# Patient Record
Sex: MALE | Race: WHITE | NOT HISPANIC OR LATINO | URBAN - METROPOLITAN AREA
[De-identification: names, ages, dates, MRNs, and addresses within clinical notes are randomized per-mention and may not be internally consistent; named-entity substitution may affect disease eponyms.]

---

## 2019-03-07 ENCOUNTER — OFFICE (AMBULATORY)
Dept: URBAN - METROPOLITAN AREA PATHOLOGY 4 | Facility: PATHOLOGY | Age: 64
End: 2019-03-07
Payer: COMMERCIAL

## 2019-03-07 ENCOUNTER — ON CAMPUS - OUTPATIENT (AMBULATORY)
Dept: URBAN - METROPOLITAN AREA HOSPITAL 2 | Facility: HOSPITAL | Age: 64
End: 2019-03-07
Payer: COMMERCIAL

## 2019-03-07 VITALS
SYSTOLIC BLOOD PRESSURE: 113 MMHG | SYSTOLIC BLOOD PRESSURE: 117 MMHG | HEART RATE: 87 BPM | OXYGEN SATURATION: 99 % | DIASTOLIC BLOOD PRESSURE: 84 MMHG | OXYGEN SATURATION: 95 % | HEART RATE: 83 BPM | HEART RATE: 91 BPM | DIASTOLIC BLOOD PRESSURE: 64 MMHG | HEART RATE: 79 BPM | WEIGHT: 140 LBS | OXYGEN SATURATION: 100 % | RESPIRATION RATE: 12 BRPM | SYSTOLIC BLOOD PRESSURE: 95 MMHG | SYSTOLIC BLOOD PRESSURE: 92 MMHG | SYSTOLIC BLOOD PRESSURE: 104 MMHG | SYSTOLIC BLOOD PRESSURE: 99 MMHG | HEART RATE: 95 BPM | DIASTOLIC BLOOD PRESSURE: 66 MMHG | OXYGEN SATURATION: 97 % | SYSTOLIC BLOOD PRESSURE: 91 MMHG | SYSTOLIC BLOOD PRESSURE: 96 MMHG | DIASTOLIC BLOOD PRESSURE: 62 MMHG | HEART RATE: 88 BPM | TEMPERATURE: 98.4 F | HEIGHT: 72 IN | DIASTOLIC BLOOD PRESSURE: 59 MMHG | SYSTOLIC BLOOD PRESSURE: 98 MMHG | DIASTOLIC BLOOD PRESSURE: 76 MMHG | SYSTOLIC BLOOD PRESSURE: 101 MMHG | HEART RATE: 97 BPM | RESPIRATION RATE: 18 BRPM | RESPIRATION RATE: 16 BRPM | DIASTOLIC BLOOD PRESSURE: 63 MMHG | HEART RATE: 86 BPM | OXYGEN SATURATION: 98 % | DIASTOLIC BLOOD PRESSURE: 67 MMHG | RESPIRATION RATE: 14 BRPM | SYSTOLIC BLOOD PRESSURE: 85 MMHG | HEART RATE: 92 BPM

## 2019-03-07 DIAGNOSIS — D12.8 BENIGN NEOPLASM OF RECTUM: ICD-10-CM

## 2019-03-07 DIAGNOSIS — K31.9 DISEASE OF STOMACH AND DUODENUM, UNSPECIFIED: ICD-10-CM

## 2019-03-07 DIAGNOSIS — K31.819 ANGIODYSPLASIA OF STOMACH AND DUODENUM WITHOUT BLEEDING: ICD-10-CM

## 2019-03-07 DIAGNOSIS — D50.0 IRON DEFICIENCY ANEMIA SECONDARY TO BLOOD LOSS (CHRONIC): ICD-10-CM

## 2019-03-07 DIAGNOSIS — D12.3 BENIGN NEOPLASM OF TRANSVERSE COLON: ICD-10-CM

## 2019-03-07 DIAGNOSIS — K63.5 POLYP OF COLON: ICD-10-CM

## 2019-03-07 DIAGNOSIS — K20.8 OTHER ESOPHAGITIS: ICD-10-CM

## 2019-03-07 DIAGNOSIS — K31.89 OTHER DISEASES OF STOMACH AND DUODENUM: ICD-10-CM

## 2019-03-07 DIAGNOSIS — K29.70 GASTRITIS, UNSPECIFIED, WITHOUT BLEEDING: ICD-10-CM

## 2019-03-07 PROBLEM — D12.2 BENIGN NEOPLASM OF ASCENDING COLON: Status: ACTIVE | Noted: 2019-03-07

## 2019-03-07 PROBLEM — K20.9 ESOPHAGITIS, UNSPECIFIED: Status: ACTIVE | Noted: 2019-03-07

## 2019-03-07 PROBLEM — K62.1 RECTAL POLYP: Status: ACTIVE | Noted: 2019-03-07

## 2019-03-07 LAB
GI HISTOLOGY: A. UNSPECIFIED: (no result)
GI HISTOLOGY: B. SELECT: (no result)
GI HISTOLOGY: C. UNSPECIFIED: (no result)
GI HISTOLOGY: D. UNSPECIFIED: (no result)
GI HISTOLOGY: PDF REPORT: (no result)

## 2019-03-07 PROCEDURE — 45385 COLONOSCOPY W/LESION REMOVAL: CPT | Performed by: INTERNAL MEDICINE

## 2019-03-07 PROCEDURE — 88305 TISSUE EXAM BY PATHOLOGIST: CPT | Performed by: INTERNAL MEDICINE

## 2019-03-07 PROCEDURE — 43270 EGD LESION ABLATION: CPT | Performed by: INTERNAL MEDICINE

## 2019-03-07 PROCEDURE — 43239 EGD BIOPSY SINGLE/MULTIPLE: CPT | Mod: 59 | Performed by: INTERNAL MEDICINE

## 2019-03-07 RX ORDER — PANTOPRAZOLE SODIUM 40 MG/1
40 TABLET, DELAYED RELEASE ORAL
Qty: 90 | Refills: 3 | Status: ACTIVE
Start: 2019-03-07

## 2019-03-07 NOTE — SERVICEHPINOTES
MELITON DEVI  is a  63  male   who presents today for a  EGD-Colonoscopy   for   the indications listed below. The updated Patient Profile was reviewed prior to the procedure, in conjunction with the Physical Exam, including medical conditions, surgical procedures, medications, allergies, family history and social history. See Physical Exam time stamp below for date and time of HPI completion.Pre-operatively, I reviewed the indication(s) for the procedure, the risks of the procedure [including but not limited to: unexpected bleeding possibly requiring hospitalization and/or unplanned repeat procedures, perforation possibly requiring surgical treatment, missed lesions and complications of sedation/MAC (also explained by anesthesia staff)]. I have evaluated the patient for risks associated with the planned anesthesia and the procedure to be performed and find the patient an acceptable candidate for IV sedation.Multiple opportunities were provided for any questions or concerns, and all questions were answered satisfactorily before any anesthesia was administered. We will proceed with the planned procedure.BR

## 2023-02-16 PROBLEM — M54.2 CHRONIC CERVICAL PAIN: Status: ACTIVE | Noted: 2023-02-16

## 2023-02-16 PROBLEM — M25.512 CHRONIC LEFT SHOULDER PAIN: Status: ACTIVE | Noted: 2023-02-16

## 2023-02-16 PROBLEM — B37.0 ORAL YEAST INFECTION: Status: ACTIVE | Noted: 2023-02-16

## 2023-02-16 PROBLEM — Z86.73 HISTORY OF STROKE: Status: ACTIVE | Noted: 2023-02-16

## 2023-02-16 PROBLEM — I10 HTN (HYPERTENSION), BENIGN: Status: ACTIVE | Noted: 2023-02-16

## 2023-02-16 PROBLEM — M54.40 LUMBAGO WITH SCIATICA: Status: ACTIVE | Noted: 2023-02-16

## 2023-02-16 PROBLEM — M50.20 CERVICAL DISC HERNIATION: Status: ACTIVE | Noted: 2023-02-16

## 2023-02-16 PROBLEM — G47.33 OSA (OBSTRUCTIVE SLEEP APNEA): Status: ACTIVE | Noted: 2023-02-16

## 2023-02-16 PROBLEM — R51.9 CEPHALGIA: Status: ACTIVE | Noted: 2023-02-16

## 2023-02-16 PROBLEM — K04.7 DENTAL ABSCESS: Status: ACTIVE | Noted: 2023-02-16

## 2023-02-16 PROBLEM — L30.9 ECZEMA: Status: ACTIVE | Noted: 2023-02-16

## 2023-02-16 PROBLEM — G89.29 CHRONIC CERVICAL PAIN: Status: ACTIVE | Noted: 2023-02-16

## 2023-02-16 PROBLEM — S22.41XD CLOSED FRACTURE OF MULTIPLE RIBS OF RIGHT SIDE WITH ROUTINE HEALING, SUBSEQUENT ENCOUNTER: Status: ACTIVE | Noted: 2023-02-16

## 2023-02-16 PROBLEM — M43.22 CERVICAL VERTEBRAL FUSION: Status: ACTIVE | Noted: 2023-02-16

## 2023-02-16 PROBLEM — H69.93 DYSFUNCTION OF BOTH EUSTACHIAN TUBES: Status: ACTIVE | Noted: 2023-02-16

## 2023-02-16 PROBLEM — M48.02 CERVICAL STENOSIS OF SPINE: Status: ACTIVE | Noted: 2023-02-16

## 2023-02-16 PROBLEM — I63.9 CVA (CEREBRAL VASCULAR ACCIDENT) (MULTI): Status: ACTIVE | Noted: 2023-02-16

## 2023-02-16 PROBLEM — F17.200 NICOTINE DEPENDENCE: Status: ACTIVE | Noted: 2023-02-16

## 2023-02-16 PROBLEM — E03.9 HYPOTHYROID: Status: ACTIVE | Noted: 2023-02-16

## 2023-02-16 PROBLEM — H61.23 BILATERAL IMPACTED CERUMEN: Status: ACTIVE | Noted: 2023-02-16

## 2023-02-16 PROBLEM — H26.9 CATARACT: Status: ACTIVE | Noted: 2023-02-16

## 2023-02-16 PROBLEM — R05.9 COUGH: Status: ACTIVE | Noted: 2023-02-16

## 2023-02-16 PROBLEM — H66.90 OTITIS MEDIA, ACUTE: Status: ACTIVE | Noted: 2023-02-16

## 2023-02-16 PROBLEM — R32 INCONTINENCE OF URINE: Status: ACTIVE | Noted: 2023-02-16

## 2023-02-16 PROBLEM — G50.0 TRIGEMINAL NEURALGIA: Status: ACTIVE | Noted: 2023-02-16

## 2023-02-16 PROBLEM — M25.551 RIGHT HIP PAIN: Status: ACTIVE | Noted: 2023-02-16

## 2023-02-16 PROBLEM — M54.12 CERVICAL RADICULITIS: Status: ACTIVE | Noted: 2023-02-16

## 2023-02-16 PROBLEM — G89.29 CHRONIC PAIN: Status: ACTIVE | Noted: 2023-02-16

## 2023-02-16 PROBLEM — F32.A DEPRESSION: Status: ACTIVE | Noted: 2023-02-16

## 2023-02-16 PROBLEM — G51.0 BELL PALSY: Status: ACTIVE | Noted: 2023-02-16

## 2023-02-16 PROBLEM — S09.90XA HEAD INJURIES: Status: ACTIVE | Noted: 2023-02-16

## 2023-02-16 PROBLEM — J20.9 ACUTE BRONCHITIS: Status: ACTIVE | Noted: 2023-02-16

## 2023-02-16 PROBLEM — M54.81 OCCIPITAL NEURALGIA: Status: ACTIVE | Noted: 2023-02-16

## 2023-02-16 PROBLEM — Z98.890 HISTORY OF BRAIN SURGERY: Status: ACTIVE | Noted: 2023-02-16

## 2023-02-16 PROBLEM — J98.01 BRONCHOSPASM, ACUTE: Status: ACTIVE | Noted: 2023-02-16

## 2023-02-16 PROBLEM — J01.90 ACUTE SINUSITIS: Status: ACTIVE | Noted: 2023-02-16

## 2023-02-16 PROBLEM — J06.9 VIRAL URI WITH COUGH: Status: ACTIVE | Noted: 2023-02-16

## 2023-02-16 PROBLEM — M62.838 TRAPEZIUS MUSCLE SPASM: Status: ACTIVE | Noted: 2023-02-16

## 2023-02-16 PROBLEM — R51.9 LEFT FACIAL PAIN: Status: ACTIVE | Noted: 2023-02-16

## 2023-02-16 PROBLEM — R53.83 FATIGUE: Status: ACTIVE | Noted: 2023-02-16

## 2023-02-16 PROBLEM — T14.8XXA NERVE INJURY: Status: ACTIVE | Noted: 2023-02-16

## 2023-02-16 PROBLEM — E78.5 HYPERLIPEMIA: Status: ACTIVE | Noted: 2023-02-16

## 2023-02-16 PROBLEM — C61 PROSTATE CA (MULTI): Status: ACTIVE | Noted: 2023-02-16

## 2023-02-16 PROBLEM — G47.00 INSOMNIA: Status: ACTIVE | Noted: 2023-02-16

## 2023-02-16 PROBLEM — M50.90 CERVICAL DISC DISEASE: Status: ACTIVE | Noted: 2023-02-16

## 2023-02-16 PROBLEM — E66.9 OBESITY (BMI 30-39.9): Status: ACTIVE | Noted: 2023-02-16

## 2023-02-16 PROBLEM — G89.29 CHRONIC LEFT SHOULDER PAIN: Status: ACTIVE | Noted: 2023-02-16

## 2023-02-16 RX ORDER — LEVOTHYROXINE SODIUM 100 UG/1
CAPSULE ORAL
COMMUNITY
End: 2023-04-25

## 2023-02-16 RX ORDER — ATORVASTATIN CALCIUM 10 MG/1
1 TABLET, FILM COATED ORAL DAILY
COMMUNITY
End: 2023-04-25 | Stop reason: SDUPTHER

## 2023-02-16 RX ORDER — OXYCODONE AND ACETAMINOPHEN 5; 325 MG/1; MG/1
1 TABLET ORAL EVERY 8 HOURS PRN
COMMUNITY
Start: 2018-04-12 | End: 2023-11-10 | Stop reason: SDUPTHER

## 2023-02-16 RX ORDER — NALOXONE HYDROCHLORIDE 4 MG/.1ML
1 SPRAY NASAL AS NEEDED
COMMUNITY
Start: 2021-08-24 | End: 2023-04-25

## 2023-02-16 RX ORDER — LISINOPRIL 5 MG/1
1 TABLET ORAL DAILY
COMMUNITY
Start: 2015-10-29 | End: 2023-04-06

## 2023-02-16 RX ORDER — LEVOTHYROXINE SODIUM 100 UG/1
TABLET ORAL
COMMUNITY
Start: 2021-03-13 | End: 2023-04-06

## 2023-03-20 LAB
6-ACETYLMORPHINE: <25 NG/ML
7-AMINOCLONAZEPAM: <25 NG/ML
ALPHA-HYDROXYALPRAZOLAM: <25 NG/ML
ALPHA-HYDROXYMIDAZOLAM: <25 NG/ML
ALPRAZOLAM: <25 NG/ML
AMPHETAMINE (PRESENCE) IN URINE BY SCREEN METHOD: ABNORMAL
BARBITURATES PRESENCE IN URINE BY SCREEN METHOD: ABNORMAL
CANNABINOIDS IN URINE BY SCREEN METHOD: ABNORMAL
CHLORDIAZEPOXIDE: <25 NG/ML
CLONAZEPAM: <25 NG/ML
COCAINE (PRESENCE) IN URINE BY SCREEN METHOD: ABNORMAL
CODEINE: <50 NG/ML
CREATINE, URINE FOR DRUG: 16.2 MG/DL
DIAZEPAM: <25 NG/ML
DRUG SCREEN COMMENT URINE: ABNORMAL
EDDP: <25 NG/ML
FENTANYL CONFIRMATION, URINE: <2.5 NG/ML
HYDROCODONE: <25 NG/ML
HYDROMORPHONE: <25 NG/ML
LORAZEPAM: <25 NG/ML
METHADONE CONFIRMATION,URINE: <25 NG/ML
MIDAZOLAM: <25 NG/ML
MORPHINE URINE: <50 NG/ML
NORDIAZEPAM: <25 NG/ML
NORFENTANYL: <2.5 NG/ML
NORHYDROCODONE: <25 NG/ML
NOROXYCODONE: 339 NG/ML
O-DESMETHYLTRAMADOL: <50 NG/ML
OXAZEPAM: <25 NG/ML
OXYCODONE: 151 NG/ML
OXYMORPHONE: 125 NG/ML
PHENCYCLIDINE (PRESENCE) IN URINE BY SCREEN METHOD: ABNORMAL
SPECIFIC GRAVITY, URINE DRUG: 1.01
TEMAZEPAM: <25 NG/ML
TRAMADOL: <50 NG/ML
ZOLPIDEM METABOLITE (ZCA): <25 NG/ML
ZOLPIDEM: <25 NG/ML

## 2023-03-30 ENCOUNTER — APPOINTMENT (OUTPATIENT)
Dept: PRIMARY CARE | Facility: CLINIC | Age: 68
End: 2023-03-30
Payer: COMMERCIAL

## 2023-04-02 DIAGNOSIS — I10 ESSENTIAL (PRIMARY) HYPERTENSION: ICD-10-CM

## 2023-04-02 DIAGNOSIS — E03.9 HYPOTHYROIDISM, UNSPECIFIED: ICD-10-CM

## 2023-04-06 RX ORDER — LEVOTHYROXINE SODIUM 100 UG/1
TABLET ORAL
Qty: 150 TABLET | Refills: 0 | Status: SHIPPED | OUTPATIENT
Start: 2023-04-06 | End: 2023-04-25 | Stop reason: SDUPTHER

## 2023-04-06 RX ORDER — LISINOPRIL 5 MG/1
TABLET ORAL
Qty: 90 TABLET | Refills: 1 | Status: SHIPPED | OUTPATIENT
Start: 2023-04-06 | End: 2023-04-25 | Stop reason: SDUPTHER

## 2023-04-24 PROBLEM — M54.50 LUMBAGO: Status: ACTIVE | Noted: 2023-04-24

## 2023-04-24 PROBLEM — M54.16 BILATERAL LUMBAR RADICULOPATHY: Status: ACTIVE | Noted: 2023-04-24

## 2023-04-24 PROBLEM — J06.9 URI (UPPER RESPIRATORY INFECTION): Status: ACTIVE | Noted: 2023-04-24

## 2023-04-24 PROBLEM — M54.2 CHRONIC NECK PAIN: Status: ACTIVE | Noted: 2023-04-24

## 2023-04-24 PROBLEM — R51.9 PAIN, FACE: Status: ACTIVE | Noted: 2023-04-24

## 2023-04-24 PROBLEM — J40 BRONCHITIS: Status: ACTIVE | Noted: 2023-04-24

## 2023-04-24 PROBLEM — J06.9 ACUTE URI: Status: ACTIVE | Noted: 2023-04-24

## 2023-04-24 PROBLEM — G89.29 CHRONIC NECK PAIN: Status: ACTIVE | Noted: 2023-04-24

## 2023-04-24 RX ORDER — LEVOTHYROXINE SODIUM 200 UG/1
200 TABLET ORAL
COMMUNITY
End: 2023-04-25 | Stop reason: SDUPTHER

## 2023-04-24 RX ORDER — SERTRALINE HYDROCHLORIDE 100 MG/1
1 TABLET, FILM COATED ORAL DAILY
COMMUNITY
Start: 2022-05-12 | End: 2023-04-25

## 2023-04-24 RX ORDER — IBUPROFEN 400 MG/1
400 TABLET ORAL
COMMUNITY
Start: 2021-09-03 | End: 2023-04-25

## 2023-04-25 ENCOUNTER — OFFICE VISIT (OUTPATIENT)
Dept: PRIMARY CARE | Facility: CLINIC | Age: 68
End: 2023-04-25
Payer: COMMERCIAL

## 2023-04-25 VITALS
HEART RATE: 65 BPM | BODY MASS INDEX: 27.92 KG/M2 | TEMPERATURE: 97.7 F | OXYGEN SATURATION: 97 % | SYSTOLIC BLOOD PRESSURE: 134 MMHG | RESPIRATION RATE: 18 BRPM | WEIGHT: 173 LBS | DIASTOLIC BLOOD PRESSURE: 68 MMHG

## 2023-04-25 DIAGNOSIS — M48.02 CERVICAL STENOSIS OF SPINE: ICD-10-CM

## 2023-04-25 DIAGNOSIS — E03.9 HYPOTHYROIDISM, UNSPECIFIED: ICD-10-CM

## 2023-04-25 DIAGNOSIS — M54.16 BILATERAL LUMBAR RADICULOPATHY: ICD-10-CM

## 2023-04-25 DIAGNOSIS — E78.5 DYSLIPIDEMIA: Primary | ICD-10-CM

## 2023-04-25 DIAGNOSIS — G47.33 OSA (OBSTRUCTIVE SLEEP APNEA): ICD-10-CM

## 2023-04-25 DIAGNOSIS — I63.519 CEREBROVASCULAR ACCIDENT (CVA) DUE TO OCCLUSION OF MIDDLE CEREBRAL ARTERY, UNSPECIFIED BLOOD VESSEL LATERALITY (MULTI): ICD-10-CM

## 2023-04-25 DIAGNOSIS — I10 ESSENTIAL (PRIMARY) HYPERTENSION: ICD-10-CM

## 2023-04-25 DIAGNOSIS — E03.9 HYPOTHYROIDISM, UNSPECIFIED TYPE: ICD-10-CM

## 2023-04-25 DIAGNOSIS — C61 PROSTATE CA (MULTI): ICD-10-CM

## 2023-04-25 PROCEDURE — 1159F MED LIST DOCD IN RCRD: CPT | Performed by: INTERNAL MEDICINE

## 2023-04-25 PROCEDURE — 3075F SYST BP GE 130 - 139MM HG: CPT | Performed by: INTERNAL MEDICINE

## 2023-04-25 PROCEDURE — 3078F DIAST BP <80 MM HG: CPT | Performed by: INTERNAL MEDICINE

## 2023-04-25 PROCEDURE — 99214 OFFICE O/P EST MOD 30 MIN: CPT | Performed by: INTERNAL MEDICINE

## 2023-04-25 RX ORDER — ATORVASTATIN CALCIUM 10 MG/1
10 TABLET, FILM COATED ORAL DAILY
Qty: 90 TABLET | Refills: 1 | Status: SHIPPED | OUTPATIENT
Start: 2023-04-25 | End: 2023-10-13 | Stop reason: SDUPTHER

## 2023-04-25 RX ORDER — LEVOTHYROXINE SODIUM 100 UG/1
TABLET ORAL
Qty: 90 TABLET | Refills: 1 | Status: SHIPPED | OUTPATIENT
Start: 2023-04-25 | End: 2023-10-10 | Stop reason: SDUPTHER

## 2023-04-25 RX ORDER — LEVOTHYROXINE SODIUM 200 UG/1
200 TABLET ORAL
Qty: 90 TABLET | Refills: 0 | Status: SHIPPED | OUTPATIENT
Start: 2023-04-25 | End: 2023-10-13 | Stop reason: SDUPTHER

## 2023-04-25 RX ORDER — LISINOPRIL 5 MG/1
5 TABLET ORAL DAILY
Qty: 90 TABLET | Refills: 1 | Status: SHIPPED | OUTPATIENT
Start: 2023-04-25 | End: 2023-10-13 | Stop reason: SDUPTHER

## 2023-04-25 ASSESSMENT — PATIENT HEALTH QUESTIONNAIRE - PHQ9
2. FEELING DOWN, DEPRESSED OR HOPELESS: NOT AT ALL
SUM OF ALL RESPONSES TO PHQ9 QUESTIONS 1 AND 2: 0
1. LITTLE INTEREST OR PLEASURE IN DOING THINGS: NOT AT ALL

## 2023-04-26 NOTE — PROGRESS NOTES
Subjective   Patient ID: Chuck Tam is a 67 y.o. male who presents for 6 month followup.    HPI patient presents to the office for scheduled visit    Since her last visit continues to have follow-up with pain management on oxycodone nothing new remains active no chest pain no dyspnea no dizziness    No GI symptoms    Active and ambulatory    Compliant with meds    Up-to-date with colon cancer evaluation    Review of Systems 10 systems reviewed with dementia were negative    Objective   /68 (BP Location: Left arm, Patient Position: Sitting)   Pulse 65   Temp 36.5 °C (97.7 °F)   Resp 18   Wt 78.5 kg (173 lb)   SpO2 97%   BMI 27.92 kg/m²     Physical Exam HEENT normocephalic atraumatic pupils round and reactive no oropharyngeal exudate no thyromegaly  Carotid bruits absent  Cardiovascular regular rate and rhythm no murmurs  Respiratory bilateral breath sounds without rales or rhonchi or mitral chest expansion bilaterally  Abdomen soft nontender bowel sounds are present no organomegaly  Skin warm without any rashes  Musculoskeletal no digital clubbing or cyanosis normal gait no muscle atrophy  Neuro alert and oriented Ãƒ-3. Speech clear. Follows commands appropriately  Pulse normal carotid pulse normal radial pulse normal pedal pulses      Assessment/Plan   Problem List Items Addressed This Visit          Nervous    Cervical stenosis of spine    CVA (cerebral vascular accident) (CMS/HCC)    NORAH (obstructive sleep apnea)    Bilateral lumbar radiculopathy       Genitourinary    Prostate CA (CMS/HCC)       Endocrine/Metabolic    Hypothyroid     Other Visit Diagnoses       Dyslipidemia    -  Primary    Relevant Medications    atorvastatin (Lipitor) 10 mg tablet    Essential (primary) hypertension        Relevant Medications    lisinopril 5 mg tablet    Other Relevant Orders    CBC and Auto Differential    Comprehensive metabolic panel    Hypothyroidism, unspecified        Relevant Medications     levothyroxine (Synthroid, Levoxyl) 200 mcg tablet    levothyroxine (Synthroid, Levoxyl) 100 mcg tablet          Labs requested    Meds refilled    Second prevention with regards to cardiovascular events discussed again    Including cessation of tobacco    We will continue to pain management    Activity level was increased as there was noted.    Statin will be continued and hepatic profile will be obtained

## 2023-06-27 ENCOUNTER — HOSPITAL ENCOUNTER (OUTPATIENT)
Dept: DATA CONVERSION | Facility: HOSPITAL | Age: 68
End: 2023-06-27
Attending: ANESTHESIOLOGY | Admitting: ANESTHESIOLOGY
Payer: COMMERCIAL

## 2023-06-27 DIAGNOSIS — Z86.73 PERSONAL HISTORY OF TRANSIENT ISCHEMIC ATTACK (TIA), AND CEREBRAL INFARCTION WITHOUT RESIDUAL DEFICITS: ICD-10-CM

## 2023-06-27 DIAGNOSIS — M54.16 RADICULOPATHY, LUMBAR REGION: ICD-10-CM

## 2023-06-27 DIAGNOSIS — G89.29 OTHER CHRONIC PAIN: ICD-10-CM

## 2023-06-27 DIAGNOSIS — E78.00 PURE HYPERCHOLESTEROLEMIA, UNSPECIFIED: ICD-10-CM

## 2023-06-27 DIAGNOSIS — F17.210 NICOTINE DEPENDENCE, CIGARETTES, UNCOMPLICATED: ICD-10-CM

## 2023-06-27 DIAGNOSIS — M62.838 OTHER MUSCLE SPASM: ICD-10-CM

## 2023-09-29 VITALS — WEIGHT: 163.14 LBS | BODY MASS INDEX: 26.22 KG/M2 | HEIGHT: 66 IN

## 2023-10-09 DIAGNOSIS — M50.20 CERVICAL DISC HERNIATION: ICD-10-CM

## 2023-10-09 DIAGNOSIS — E03.9 HYPOTHYROIDISM, UNSPECIFIED TYPE: Primary | ICD-10-CM

## 2023-10-09 DIAGNOSIS — E03.9 HYPOTHYROIDISM, UNSPECIFIED: ICD-10-CM

## 2023-10-09 DIAGNOSIS — I10 HTN (HYPERTENSION), BENIGN: ICD-10-CM

## 2023-10-09 DIAGNOSIS — Z12.5 PROSTATE CANCER SCREENING: ICD-10-CM

## 2023-10-09 RX ORDER — LEVOTHYROXINE SODIUM 100 UG/1
TABLET ORAL
Qty: 90 TABLET | Refills: 1 | OUTPATIENT
Start: 2023-10-09

## 2023-10-09 NOTE — TELEPHONE ENCOUNTER
Medication refused due to failing protocol.    Requested Prescriptions   Pending Prescriptions Disp Refills    levothyroxine (Synthroid, Levoxyl) 100 mcg tablet 90 tablet 1     Sig: TAKE 2 TABLETS EVERY MONDAY, tuesday, WEDNESDAY, THURSDAY, and 1 (ONE) TABLET EVERY FRIDAY, SATURDAY, SUNDAY       Thyroid Hormones Protocol Failed - 10/9/2023  3:20 PM        Failed - Normal TSH in past 12 months     TSH   Date Value Ref Range Status   07/16/2021 7.95 (H) 0.44 - 3.98 mIU/L Final     Comment:      TSH testing is performed using different testing    methodology at [facility] than at other    [facility]. Direct result comparisons should    only be made within the same method.               Passed - Visit with relevant provider in past 12 months or upcoming 90 days     Recent Visits  Date Type Provider Dept   04/25/23 Office Visit Jodi Pearce MD [id number]   Showing recent visits within past 365 days and meeting all other requirements  Future Appointments  Date Type Provider Dept   10/13/23 Appointment Reji DORADO MD [id number]   Showing future appointments within next 90 days and meeting all other requirements              Passed - Medication not refilled in past 45 days (1.5 months)     No matching medication orders between 8/25/2023  3:59 PM and 10/9/2023  3:59 PM

## 2023-10-10 DIAGNOSIS — E03.9 HYPOTHYROIDISM, UNSPECIFIED: ICD-10-CM

## 2023-10-10 NOTE — TELEPHONE ENCOUNTER
Rx Refill Request Telephone Encounter    Name:  Chuck Tam  :  [id number]  Medication Name:    Requested Prescriptions     Pending Prescriptions Disp Refills    levothyroxine (Synthroid, Levoxyl) 100 mcg tablet 90 tablet 1     Sig: TAKE 2 TABLETS EVERY MONDAY, tuesday, WEDNESDAY, THURSDAY, and 1 (ONE) TABLET EVERY FRIDAY, SATURDAY,     Specific Pharmacy location:     [facility] [address] [place] [address] [address]  [address]  [facility] [address]  Phone: [phone] Fax: [phone]   Date of last appointment: 23   Date of next appointment: 10/13/23  Best number to reach patient:  [phone]

## 2023-10-11 RX ORDER — LEVOTHYROXINE SODIUM 100 UG/1
TABLET ORAL
Qty: 90 TABLET | Refills: 1 | Status: SHIPPED | OUTPATIENT
Start: 2023-10-11 | End: 2023-10-13 | Stop reason: SDUPTHER

## 2023-10-13 ENCOUNTER — OFFICE VISIT (OUTPATIENT)
Dept: PRIMARY CARE | Facility: CLINIC | Age: 68
End: 2023-10-13
Payer: COMMERCIAL

## 2023-10-13 VITALS
TEMPERATURE: 97.5 F | OXYGEN SATURATION: 97 % | HEART RATE: 58 BPM | BODY MASS INDEX: 27.94 KG/M2 | WEIGHT: 172.6 LBS | SYSTOLIC BLOOD PRESSURE: 130 MMHG | DIASTOLIC BLOOD PRESSURE: 74 MMHG

## 2023-10-13 DIAGNOSIS — F33.41 RECURRENT MAJOR DEPRESSIVE DISORDER, IN PARTIAL REMISSION (CMS-HCC): ICD-10-CM

## 2023-10-13 DIAGNOSIS — Z98.890 HISTORY OF BRAIN SURGERY: Primary | ICD-10-CM

## 2023-10-13 DIAGNOSIS — I10 ESSENTIAL (PRIMARY) HYPERTENSION: ICD-10-CM

## 2023-10-13 DIAGNOSIS — R26.81 GAIT INSTABILITY: ICD-10-CM

## 2023-10-13 DIAGNOSIS — G89.4 CHRONIC PAIN SYNDROME: ICD-10-CM

## 2023-10-13 DIAGNOSIS — E03.9 HYPOTHYROIDISM, UNSPECIFIED: ICD-10-CM

## 2023-10-13 DIAGNOSIS — Z00.00 MEDICARE ANNUAL WELLNESS VISIT, SUBSEQUENT: ICD-10-CM

## 2023-10-13 DIAGNOSIS — I69.959 HEMIPARESIS AS LATE EFFECT OF CEREBROVASCULAR DISEASE, UNSPECIFIED CEREBROVASCULAR DISEASE TYPE, UNSPECIFIED LATERALITY (MULTI): ICD-10-CM

## 2023-10-13 DIAGNOSIS — E78.5 DYSLIPIDEMIA: ICD-10-CM

## 2023-10-13 DIAGNOSIS — C61 PROSTATE CA (MULTI): ICD-10-CM

## 2023-10-13 DIAGNOSIS — I69.30 SEQUELA OF CEREBROVASCULAR ACCIDENT: ICD-10-CM

## 2023-10-13 PROCEDURE — G0439 PPPS, SUBSEQ VISIT: HCPCS | Performed by: FAMILY MEDICINE

## 2023-10-13 PROCEDURE — 1125F AMNT PAIN NOTED PAIN PRSNT: CPT | Performed by: FAMILY MEDICINE

## 2023-10-13 PROCEDURE — G0008 ADMIN INFLUENZA VIRUS VAC: HCPCS | Performed by: FAMILY MEDICINE

## 2023-10-13 PROCEDURE — 90662 IIV NO PRSV INCREASED AG IM: CPT | Performed by: FAMILY MEDICINE

## 2023-10-13 PROCEDURE — 1159F MED LIST DOCD IN RCRD: CPT | Performed by: FAMILY MEDICINE

## 2023-10-13 PROCEDURE — 3075F SYST BP GE 130 - 139MM HG: CPT | Performed by: FAMILY MEDICINE

## 2023-10-13 PROCEDURE — 3078F DIAST BP <80 MM HG: CPT | Performed by: FAMILY MEDICINE

## 2023-10-13 PROCEDURE — 99214 OFFICE O/P EST MOD 30 MIN: CPT | Performed by: FAMILY MEDICINE

## 2023-10-13 PROCEDURE — 1170F FXNL STATUS ASSESSED: CPT | Performed by: FAMILY MEDICINE

## 2023-10-13 PROCEDURE — 1160F RVW MEDS BY RX/DR IN RCRD: CPT | Performed by: FAMILY MEDICINE

## 2023-10-13 RX ORDER — LEVOTHYROXINE SODIUM 100 UG/1
TABLET ORAL
Qty: 90 TABLET | Refills: 3 | Status: SHIPPED | OUTPATIENT
Start: 2023-10-13

## 2023-10-13 RX ORDER — ATORVASTATIN CALCIUM 10 MG/1
10 TABLET, FILM COATED ORAL DAILY
Qty: 90 TABLET | Refills: 3 | Status: SHIPPED | OUTPATIENT
Start: 2023-10-13 | End: 2024-10-07

## 2023-10-13 RX ORDER — LISINOPRIL 5 MG/1
5 TABLET ORAL DAILY
Qty: 90 TABLET | Refills: 3 | Status: SHIPPED | OUTPATIENT
Start: 2023-10-13

## 2023-10-13 ASSESSMENT — ACTIVITIES OF DAILY LIVING (ADL)
DRESSING: INDEPENDENT
DOING_HOUSEWORK: INDEPENDENT
GROCERY_SHOPPING: INDEPENDENT
TAKING_MEDICATION: INDEPENDENT
MANAGING_FINANCES: INDEPENDENT
BATHING: INDEPENDENT

## 2023-10-13 ASSESSMENT — PATIENT HEALTH QUESTIONNAIRE - PHQ9
SUM OF ALL RESPONSES TO PHQ9 QUESTIONS 1 AND 2: 0
2. FEELING DOWN, DEPRESSED OR HOPELESS: NOT AT ALL
1. LITTLE INTEREST OR PLEASURE IN DOING THINGS: NOT AT ALL

## 2023-10-13 NOTE — PROGRESS NOTES
Subjective   Patient ID: Chcuk Tam is a 68 y.o. male who presents for Annual Exam (Medicare Wellness /Needs Medications refilled/Needs handicap placard renewal).    HPI : same as above    Usually goes to visit daughter in [facility] 3 to 4 months and stays with daughter in [place] 3 to 4 months.  S/p brain surgery x 2   S/o CVA  with sequale Residual hemiplegia, lost fine motor movements, instable gait  Developing cataract in left eye  Smoker for 50 + years.    Review of Systems   All other systems reviewed and are negative.      Objective   /74 (BP Location: Left arm, Patient Position: Sitting, BP Cuff Size: Adult)   Pulse 58   Temp 36.4 °C (97.5 °F) (Temporal)   Wt 78.3 kg (172 lb 9.6 oz)   SpO2 97%   BMI 27.94 kg/m²     Physical Exam  Vitals and nursing note reviewed.   Cardiovascular:      Rate and Rhythm: Normal rate and regular rhythm.   Pulmonary:      Breath sounds: Decreased breath sounds and wheezing present.   Neurological:      Mental Status: He is alert.      Motor: Weakness and abnormal muscle tone present.      Gait: Gait abnormal.         Assessment/Plan   Diagnoses and all orders for this visit:  History of brain surgery  -     Disability Placard  Sequela of cerebrovascular accident  -     Disability Placard  Gait instability  Comments:  from CVA,Brain surgery  Orders:  -     Disability Placard  Dyslipidemia  -     atorvastatin (Lipitor) 10 mg tablet; Take 1 tablet (10 mg) by mouth once daily.  Hypothyroidism, unspecified  Comments:  on Synthyroid 200 mcg 4 times/wk and 100mch 3 times/wk  Orders:  -     levothyroxine (Synthroid, Levoxyl) 100 mcg tablet; TAKE 2 TABLETS EVERY MONDAY, tuesday, WEDNESDAY, THURSDAY, and 1 (ONE) TABLET EVERY FRIDAY, SATURDAY, SUNDAY  Essential (primary) hypertension  Comments:  well controlled  Orders:  -     lisinopril 5 mg tablet; Take 1 tablet (5 mg) by mouth once daily.  Hemiparesis as late effect of cerebrovascular disease, unspecified cerebrovascular  disease type, unspecified laterality (CMS/HCC)  Recurrent major depressive disorder, in partial remission (CMS/HCC)  Comments:  in remission  Prostate CA (CMS/HCC)  Chronic pain syndrome  Comments:  follows with pain clinic on Oxycodone  Other orders  -     Follow Up In Primary Care - Established; Future  -     Flu vaccine, quadrivalent, high-dose, preservative free, age 65y+ (FLUZONE)

## 2023-10-24 ENCOUNTER — APPOINTMENT (OUTPATIENT)
Dept: PRIMARY CARE | Facility: CLINIC | Age: 68
End: 2023-10-24
Payer: COMMERCIAL

## 2023-11-10 DIAGNOSIS — M54.2 CHRONIC NECK PAIN: Primary | ICD-10-CM

## 2023-11-10 DIAGNOSIS — G89.29 CHRONIC NECK PAIN: Primary | ICD-10-CM

## 2023-11-13 RX ORDER — OXYCODONE AND ACETAMINOPHEN 5; 325 MG/1; MG/1
1 TABLET ORAL EVERY 8 HOURS PRN
Qty: 90 TABLET | Refills: 0 | Status: SHIPPED | OUTPATIENT
Start: 2023-11-13 | End: 2023-12-08 | Stop reason: SDUPTHER

## 2023-12-08 DIAGNOSIS — M54.2 CHRONIC NECK PAIN: ICD-10-CM

## 2023-12-08 DIAGNOSIS — G89.29 CHRONIC NECK PAIN: ICD-10-CM

## 2023-12-11 RX ORDER — OXYCODONE AND ACETAMINOPHEN 5; 325 MG/1; MG/1
1 TABLET ORAL EVERY 8 HOURS PRN
Qty: 90 TABLET | Refills: 0 | Status: SHIPPED | OUTPATIENT
Start: 2023-12-13 | End: 2024-01-08 | Stop reason: SDUPTHER

## 2023-12-18 ENCOUNTER — OFFICE VISIT (OUTPATIENT)
Dept: PAIN MEDICINE | Facility: CLINIC | Age: 68
End: 2023-12-18
Payer: COMMERCIAL

## 2023-12-18 VITALS
TEMPERATURE: 97.5 F | DIASTOLIC BLOOD PRESSURE: 79 MMHG | HEART RATE: 60 BPM | RESPIRATION RATE: 14 BRPM | BODY MASS INDEX: 26.38 KG/M2 | SYSTOLIC BLOOD PRESSURE: 131 MMHG | WEIGHT: 163 LBS

## 2023-12-18 DIAGNOSIS — M54.12 CERVICAL RADICULITIS: ICD-10-CM

## 2023-12-18 DIAGNOSIS — M48.02 CERVICAL STENOSIS OF SPINE: ICD-10-CM

## 2023-12-18 DIAGNOSIS — M50.20 CERVICAL DISC HERNIATION: Primary | ICD-10-CM

## 2023-12-18 DIAGNOSIS — M54.42 CHRONIC BILATERAL LOW BACK PAIN WITH BILATERAL SCIATICA: ICD-10-CM

## 2023-12-18 DIAGNOSIS — M54.41 CHRONIC BILATERAL LOW BACK PAIN WITH BILATERAL SCIATICA: ICD-10-CM

## 2023-12-18 DIAGNOSIS — G89.29 CHRONIC BILATERAL LOW BACK PAIN WITH BILATERAL SCIATICA: ICD-10-CM

## 2023-12-18 DIAGNOSIS — M50.90 CERVICAL DISC DISEASE: ICD-10-CM

## 2023-12-18 PROCEDURE — 99214 OFFICE O/P EST MOD 30 MIN: CPT | Performed by: ANESTHESIOLOGY

## 2023-12-18 SDOH — ECONOMIC STABILITY: FOOD INSECURITY: WITHIN THE PAST 12 MONTHS, YOU WORRIED THAT YOUR FOOD WOULD RUN OUT BEFORE YOU GOT MONEY TO BUY MORE.: NEVER TRUE

## 2023-12-18 SDOH — ECONOMIC STABILITY: FOOD INSECURITY: WITHIN THE PAST 12 MONTHS, THE FOOD YOU BOUGHT JUST DIDN'T LAST AND YOU DIDN'T HAVE MONEY TO GET MORE.: NEVER TRUE

## 2023-12-18 ASSESSMENT — LIFESTYLE VARIABLES
HOW OFTEN DO YOU HAVE SIX OR MORE DRINKS ON ONE OCCASION: NEVER
HOW MANY STANDARD DRINKS CONTAINING ALCOHOL DO YOU HAVE ON A TYPICAL DAY: PATIENT DOES NOT DRINK
HOW OFTEN DO YOU HAVE A DRINK CONTAINING ALCOHOL: NEVER
SKIP TO QUESTIONS 9-10: 1
AUDIT-C TOTAL SCORE: 0

## 2023-12-18 ASSESSMENT — PATIENT HEALTH QUESTIONNAIRE - PHQ9
1. LITTLE INTEREST OR PLEASURE IN DOING THINGS: NOT AT ALL
SUM OF ALL RESPONSES TO PHQ9 QUESTIONS 1 AND 2: 0
2. FEELING DOWN, DEPRESSED OR HOPELESS: NOT AT ALL

## 2023-12-18 ASSESSMENT — PAIN SCALES - GENERAL: PAINLEVEL: 10-WORST PAIN EVER

## 2023-12-18 NOTE — PROGRESS NOTES
History Of Present Illness  Chuck Tam is a 68 y.o. male presenting with   Chief Complaint   Patient presents with    Back Pain       Patient returns for worsening head pain and was found to have a mass on exam. He reports he is going [place], [place] to have his mass worked up from JAN to FEB 2024.  He has a long hx of chronic occipital pain on the right side. He also has a history of facial pain, neck pain, headache pain, midback pain.      Recall: He is s/p revision facial surgery on 11-.      Recall:  He is s/p facial and jaw surgery on 1-3-19 for NORAH s/p trach placement and is now healing. He lost 20 lbs during the time his jaw was wired shut. He has a F/U appt on 1-25-19, 2-1-19 with Dr. Talley [facility]. He was inpatient from Jan 3rd -Jan 14th, 2019. He had a subsequent surgery with Dr. Talley 8-28-19 for additional jaw surgery.      The pain is constant, worse with activity and better with rest. The patient is s/p ACDF in [place] and another ACDF in [place]. He has a hx of Stroke, Craniotomy, and Prostate CA s/p prostatectomy. He did not require any chemo or radiation therapy.      The pain is pressure in his head and sharp, stabbing and shooting to the B/L shoulders. Denies LE paresthesias, weakness, saddle anesthesia, bowel or bladder incontinence. To manage this pain the patient has attempted Percocet 7.5/325mg three times a day he reports 90 pills has lasted him 2 months. He has tried Lyrica, Tramadol, Ibuprofen with no relief. The patient has undergone SNOW 2 years ago with > 50% relief.      The pt reports he quit Tob on Jan 2nd, 2019 since he wasn't able to inhale after his surgery with Dr. Melendez on Jan 3rd, 2019 to remove his oral hardware. However, he has restarted and is currently smoking 2ppd.      SocHx  Denies any drug use  Positive Tobacco 2ppd for many years   Positive EtOH     PAIN SCORE: 7/10 - in his head      Neuro: Juana Page  PCP: Dr. Pearce          Past Medical History  He has a past  medical history of Anxiety disorder, unspecified, Cervical disc disorder, unspecified, unspecified cervical region (12/14/2017), Obstructive sleep apnea (adult) (pediatric), Personal history of (healed) traumatic fracture, Personal history of malignant neoplasm of prostate, Personal history of malignant neoplasm of prostate (10/31/2018), Personal history of malignant neoplasm, unspecified, Personal history of other diseases of the circulatory system, Personal history of other diseases of the digestive system (12/14/2020), Personal history of other endocrine, nutritional and metabolic disease (02/22/2019), Personal history of transient ischemic attack (TIA), and cerebral infarction without residual deficits, and Trigeminal neuralgia (01/15/2021).    Surgical History  He has a past surgical history that includes Carpal tunnel release (02/25/2016); Shoulder surgery (02/25/2016); Other surgical history (09/07/2018); Other surgical history (01/22/2018); Other surgical history (06/04/2020); Elbow surgery (11/08/2017); Neck surgery (11/08/2017); and Cervical fusion (03/01/2021).     Social History  He reports that he has been smoking cigarettes. He has a 100.00 pack-year smoking history. He has never used smokeless tobacco. He reports that he does not drink alcohol and does not use drugs.    Family History  Family History   Problem Relation Name Age of Onset    COPD Mother      Other (OPCD (olivopontocerebellar degeneration)) Mother      Cancer Father      Lupus Sister          SLE (systemic lupus erthematosus related syndrome)        Allergies  Patient has no known allergies.    Review of Systems    All other systems reviewed and negative for any deficits. Pertinent positives and negatives were considered in the medical decision making process.        Physical Exam  /79   Pulse 60   Temp 36.4 °C (97.5 °F)   Resp 14   Wt 73.9 kg (163 lb)     General: Pt appears stated age      Eyes: Conjunctiva non-icteric and  lids without obvious rash or drooping. Pupils are symmetric     ENT: External ears and nose appear to be without deformity or rash. No lesions or masses noted. Hearing is grossly intact     Neck: No JVD noted, tracheal position midline.     Musculoskeletal: Gait is grossly normal     Digits/nails show no clubbing or cyanosis     Exam of muscles/joints/bones shows no gross atrophy and no abnormal/involuntary movements in the head/neckNo asymmetry or masses noted in the head/neck     Stability: no subluxation noted on movement of bilateral upper extremities or head/neck     Strength: 4/5 in RUE and 5/5 in LUE      Range of Motion: WNL      Sensation: dec to sharp touch in RUE     Cranial nerves 2-12 are grossly intact     Psychiatric: Pt is alert and oriented to time, place and person. No signs of opiate intoxication or withdrawal.     Assessment/Plan   1. Cervical disc herniation        2. Cervical stenosis of spine        3. Cervical disc disease        4. Cervical radiculitis        5. Chronic bilateral low back pain with bilateral sciatica              Diagnoses/Problems     · Chronic low back pain (724.2,338.29) (M54.50,G89.29)   · Cervical disc herniation (722.0) (M50.20)   · Cervical radiculitis (723.4) (M54.12)   · Chronic neck pain (723.1,338.29) (M54.2,G89.29)   · Lumbar disc herniation (722.10) (M51.26)   · Lumbar foraminal stenosis (724.02) (M48.061)   · Lumbar neuritis (724.4) (M54.16)   · Tobacco abuse counseling (V65.42,305.1) (Z71.6)   · Balance problem (781.99) (R26.89)       Provider Impressions        1. I have previously provided the patient with a list of physical therapy exercises to learn and perform.     2. The patient is a candidate for a LEFT SHOULDER JOINT INJECTION AND SNOW VERSUS TRIGGER POINT INJECTION VERSUS Occipital NB vs SNOW C7/T1 to treat back and radicular pain. I spent time with the patient discussing all of the risks, benefits, and alternatives to this measure. Including but not  limited to spinal infection, epidural hematoma/abscess, paralysis, nerve injury, steroid effects, and spinal headache. The patient understands and agrees to proceed as needed.      He underwent a RIGHT OCCIPITAL BLOCK SNOW on 5- and again SNOW and Left Trap TPI on 9- and he reported 70-80% relief with each of these procedures lasting for 3 months time. He would like to schedule a repeat block. Reporting it is difficult to turn his head without pain. After the injection this had improved.     3. The pt was previously taking Gabapentin to help with nerve related pain. However, he developed GI distress after taking this medication. I did start him on Topamax instead, however, he was only taking this once a day and did not follow the directions. This was reviewed with him in detail.      He also has a hx of Trigeminal neuralgia as well.     4. I again advised the patient to quit tobacco as it worsens chronic pain, disc health, and can increase the risk of complications and poor healing with any procedure. Tobacco also causes a whole host of other deadly diseases. I informed the patient that the single most important thing they can do to benefit their health would be to quit tobacco.     He was able to quit for 3 months after his Jaw surgery from Jan - April 2019. HOwever, he restarted. Pt was given information to help him quit.      5. We did discuss the risks, benefits, and alternatives to chronic opioid therapy and that usage can be a danger to life. We also discussed proper and safe storage of medication to prevent unauthorized usage. The patient understands and will use the medicine responsibly.     We did check a urine screen on 2-22-19 and it was appropriately positive for Tramadol and Oxycodone.      Pt signed an opiate contract on 5- and again on 4- and it was reviewed line by line.   Pt provided a UDS on 5- and it was appropriate.      CSA completed 4-, 3-  UDS  on 4-, 3-  OARRS reviewed at his visit.   ORT completed on 1-  Narcan prescribed 8-, 5-      Pt was previously also given a prescription for Narcan. We again discussed how to use this medication in detail as well as the risks, benefits, and side effects.      6. I extensively reviewed the patients MRI findings in detail, including review of the actual images and provided a detailed explanation of the findings using a spine model.      There is a disc herniation at the L5/S1 with severe foraminal stenosis on the right side at the L5 level.      7. The patient is a candidate for an A RIGHT LTR AT L5 AND S1 and occipital block to treat back and radicular pain. I spent time with the patient discussing all of the risks, benefits, and alternatives to this measure. Including but not limited to spinal infection, epidural hematoma/abscess, paralysis, nerve injury, steroid effects, and spinal headache. The patient understands and agrees to proceed.     His last injection was on RIGHT LTR at L5 and S1 on 6- and he reported 100% relief of his pain that is ONGOING for now.      FUV 3 months. Pt reports he is going to be in [place] from January 2024 to Feb 2024 for his cancer work up care.      I spent time with the patient reviewing their imaging and discussing the risks benefits and alternatives to the above plan. A total of 30 minutes was spent reviewing the data and greater than 50% of that time was with the patient during the face to face encounter discussing treatment options both surgical, non-surgical, and minimally invasive techniques.       Brien Tovar MD

## 2024-01-08 DIAGNOSIS — M54.2 CHRONIC NECK PAIN: ICD-10-CM

## 2024-01-08 DIAGNOSIS — G89.29 CHRONIC NECK PAIN: ICD-10-CM

## 2024-01-10 RX ORDER — OXYCODONE AND ACETAMINOPHEN 5; 325 MG/1; MG/1
1 TABLET ORAL EVERY 8 HOURS PRN
Qty: 90 TABLET | Refills: 0 | Status: SHIPPED | OUTPATIENT
Start: 2024-01-10 | End: 2024-02-06 | Stop reason: SDUPTHER

## 2024-02-06 DIAGNOSIS — M54.2 CHRONIC NECK PAIN: ICD-10-CM

## 2024-02-06 DIAGNOSIS — G89.29 CHRONIC NECK PAIN: ICD-10-CM

## 2024-02-12 RX ORDER — OXYCODONE AND ACETAMINOPHEN 5; 325 MG/1; MG/1
1 TABLET ORAL EVERY 8 HOURS PRN
Qty: 90 TABLET | Refills: 0 | Status: SHIPPED | OUTPATIENT
Start: 2024-02-12 | End: 2024-03-04 | Stop reason: SDUPTHER

## 2024-03-04 DIAGNOSIS — M54.2 CHRONIC NECK PAIN: ICD-10-CM

## 2024-03-04 DIAGNOSIS — G89.29 CHRONIC NECK PAIN: ICD-10-CM

## 2024-03-05 RX ORDER — OXYCODONE AND ACETAMINOPHEN 5; 325 MG/1; MG/1
1 TABLET ORAL EVERY 8 HOURS PRN
Qty: 90 TABLET | Refills: 0 | Status: SHIPPED | OUTPATIENT
Start: 2024-03-12 | End: 2024-04-15 | Stop reason: SDUPTHER

## 2024-03-18 ENCOUNTER — OFFICE VISIT (OUTPATIENT)
Dept: PAIN MEDICINE | Facility: CLINIC | Age: 69
End: 2024-03-18
Payer: MEDICARE

## 2024-03-18 VITALS
TEMPERATURE: 96.8 F | HEART RATE: 77 BPM | RESPIRATION RATE: 16 BRPM | DIASTOLIC BLOOD PRESSURE: 88 MMHG | BODY MASS INDEX: 26.31 KG/M2 | WEIGHT: 163 LBS | OXYGEN SATURATION: 97 % | SYSTOLIC BLOOD PRESSURE: 132 MMHG

## 2024-03-18 DIAGNOSIS — M62.838 CERVICAL PARASPINAL MUSCLE SPASM: ICD-10-CM

## 2024-03-18 DIAGNOSIS — M50.90 CERVICAL DISC DISEASE: ICD-10-CM

## 2024-03-18 DIAGNOSIS — M50.20 CERVICAL DISC HERNIATION: ICD-10-CM

## 2024-03-18 DIAGNOSIS — M54.81 BILATERAL OCCIPITAL NEURALGIA: ICD-10-CM

## 2024-03-18 DIAGNOSIS — M54.12 CERVICAL RADICULITIS: ICD-10-CM

## 2024-03-18 DIAGNOSIS — Z79.891 LONG TERM (CURRENT) USE OF OPIATE ANALGESIC: Primary | ICD-10-CM

## 2024-03-18 PROCEDURE — 80346 BENZODIAZEPINES1-12: CPT | Performed by: ANESTHESIOLOGY

## 2024-03-18 PROCEDURE — 82570 ASSAY OF URINE CREATININE: CPT | Mod: 59 | Performed by: ANESTHESIOLOGY

## 2024-03-18 PROCEDURE — 99214 OFFICE O/P EST MOD 30 MIN: CPT | Performed by: ANESTHESIOLOGY

## 2024-03-18 ASSESSMENT — ENCOUNTER SYMPTOMS
OCCASIONAL FEELINGS OF UNSTEADINESS: 1
LOSS OF SENSATION IN FEET: 0
DEPRESSION: 0

## 2024-03-18 ASSESSMENT — PAIN SCALES - GENERAL: PAINLEVEL: 10-WORST PAIN EVER

## 2024-03-18 NOTE — PROGRESS NOTES
History Of Present Illness  Chuck Tam is a 68 y.o. male presenting with   Chief Complaint   Patient presents with    Pain       Patient returns for worsening head pain and was found to have a mass on exam. He reports he is going [place], [place] to have his mass worked up from JAN to FEB 2024.  He has a long hx of chronic occipital pain on the right side. He also has a history of facial pain, neck pain, headache pain, midback pain.      Recall: He is s/p revision facial surgery on 11-.      Recall:  He is s/p facial and jaw surgery on 1-3-19 for NORAH s/p trach placement and is now healing. He lost 20 lbs during the time his jaw was wired shut. He has a F/U appt on 1-25-19, 2-1-19 with Dr. Talley [facility]. He was inpatient from Jan 3rd -Jan 14th, 2019. He had a subsequent surgery with Dr. Talley 8-28-19 for additional jaw surgery.      The pain is constant, worse with activity and better with rest. The patient is s/p ACDF in [place] and another ACDF in [place]. He has a hx of Stroke, Craniotomy, and Prostate CA s/p prostatectomy. He did not require any chemo or radiation therapy.      The pain is pressure in his head and sharp, stabbing and shooting to the B/L shoulders. Denies LE paresthesias, weakness, saddle anesthesia, bowel or bladder incontinence. To manage this pain the patient has attempted Percocet 7.5/325mg three times a day he reports 90 pills has lasted him 2 months. He has tried Lyrica, Tramadol, Ibuprofen with no relief. The patient has undergone SNOW 2 years ago with > 50% relief.      The pt reports he quit Tob on Jan 2nd, 2019 since he wasn't able to inhale after his surgery with Dr. Melendez on Jan 3rd, 2019 to remove his oral hardware. However, he has restarted and is currently smoking 2ppd.      SocHx  Denies any drug use  Positive Tobacco 2ppd for many years   Positive EtOH     PAIN SCORE: 7/10 - in his head      Neuro: Juana Page  PCP: Dr. Pearce          Past Medical History  He has a past  medical history of Anxiety disorder, unspecified, Cervical disc disorder, unspecified, unspecified cervical region (12/14/2017), Obstructive sleep apnea (adult) (pediatric), Personal history of (healed) traumatic fracture, Personal history of malignant neoplasm of prostate, Personal history of malignant neoplasm of prostate (10/31/2018), Personal history of malignant neoplasm, unspecified, Personal history of other diseases of the circulatory system, Personal history of other diseases of the digestive system (12/14/2020), Personal history of other endocrine, nutritional and metabolic disease (02/22/2019), Personal history of transient ischemic attack (TIA), and cerebral infarction without residual deficits, and Trigeminal neuralgia (01/15/2021).    Surgical History  He has a past surgical history that includes Carpal tunnel release (02/25/2016); Shoulder surgery (02/25/2016); Other surgical history (09/07/2018); Other surgical history (01/22/2018); Other surgical history (06/04/2020); Elbow surgery (11/08/2017); Neck surgery (11/08/2017); and Cervical fusion (03/01/2021).     Social History  He reports that he has been smoking cigarettes. He has a 100.00 pack-year smoking history. He has never used smokeless tobacco. He reports that he does not drink alcohol and does not use drugs.    Family History  Family History   Problem Relation Name Age of Onset    COPD Mother      Other (OPCD (olivopontocerebellar degeneration)) Mother      Cancer Father      Lupus Sister          SLE (systemic lupus erthematosus related syndrome)        Allergies  Patient has no known allergies.    Review of Systems    All other systems reviewed and negative for any deficits. Pertinent positives and negatives were considered in the medical decision making process.        Physical Exam  /88   Pulse 77   Temp 36 °C (96.8 °F)   Resp 16   Wt 73.9 kg (163 lb)   SpO2 97%     General: Pt appears stated age      Eyes: Conjunctiva  non-icteric and lids without obvious rash or drooping. Pupils are symmetric     ENT: External ears and nose appear to be without deformity or rash. No lesions or masses noted. Hearing is grossly intact     Neck: No JVD noted, tracheal position midline.     Musculoskeletal: Gait is grossly normal     Digits/nails show no clubbing or cyanosis     Exam of muscles/joints/bones shows no gross atrophy and no abnormal/involuntary movements in the head/neckNo asymmetry or masses noted in the head/neck     Stability: no subluxation noted on movement of bilateral upper extremities or head/neck     Strength: 4/5 in RUE and 5/5 in LUE      Range of Motion: WNL      Sensation: dec to sharp touch in RUE     Cranial nerves 2-12 are grossly intact     Psychiatric: Pt is alert and oriented to time, place and person. No signs of opiate intoxication or withdrawal.     Assessment/Plan   1. Long term (current) use of opiate analgesic  Opiate/Opioid/Benzo Prescription Compliance    OOB Internal Tracking            Diagnoses/Problems     · Chronic low back pain (724.2,338.29) (M54.50,G89.29)   · Cervical disc herniation (722.0) (M50.20)   · Cervical radiculitis (723.4) (M54.12)   · Chronic neck pain (723.1,338.29) (M54.2,G89.29)   · Lumbar disc herniation (722.10) (M51.26)   · Lumbar foraminal stenosis (724.02) (M48.061)   · Lumbar neuritis (724.4) (M54.16)   · Tobacco abuse counseling (V65.42,305.1) (Z71.6)   · Balance problem (781.99) (R26.89)       Provider Impressions        1. I have previously provided the patient with a list of physical therapy exercises to learn and perform.     2. The patient is a candidate for a SNOW at C7/T1 VERSUS TRIGGER POINT INJECTION vs SNOW C7/T1 to treat back and radicular pain. I spent time with the patient discussing all of the risks, benefits, and alternatives to this measure. Including but not limited to spinal infection, epidural hematoma/abscess, paralysis, nerve injury, steroid effects, and spinal  headache. The patient understands and agrees to proceed as needed.        3. Recall: The pt was previously taking Gabapentin to help with nerve related pain. However, he developed GI distress after taking this medication. I did start him on Topamax instead, however, he was only taking this once a day and did not follow the directions. This was reviewed with him in detail.      He also has a hx of Trigeminal neuralgia as well.     4. I once again advised the patient to quit tobacco as it worsens chronic pain, disc health, and can increase the risk of complications and poor healing with any procedure. Tobacco also causes a whole host of other deadly diseases. I informed the patient that the single most important thing they can do to benefit their health would be to quit tobacco.     He was able to quit for 3 months after his Jaw surgery from Jan - April 2019. HOwever, he restarted. Pt was given information to help him quit.      5. We did discuss the risks, benefits, and alternatives to chronic opioid therapy and that usage can be a danger to life. We also discussed proper and safe storage of medication to prevent unauthorized usage. The patient understands and will use the medicine responsibly.     We did check a urine screen on 2-22-19 and it was appropriately positive for Tramadol and Oxycodone.      Pt signed an opiate contract on 5- and again on 4- and it was reviewed line by line.   Pt provided a UDS on 5- and it was appropriate.      CSA completed 4-, 3-, 3-  UDS on 4-, 3-  OARRS reviewed at his visit.   ORT completed on 1-  Narcan prescribed 8-, 5-      Pt was previously also given a prescription for Narcan. We again discussed how to use this medication in detail as well as the risks, benefits, and side effects.      6. I extensively reviewed the patients MRI findings in detail, including review of the actual images and provided a  detailed explanation of the findings using a spine model.      There is a disc herniation at the L5/S1 with severe foraminal stenosis on the right side at the L5 level.      7. The patient is a candidate for an A RIGHT LTR AT L5 AND S1 and occipital block to treat back and radicular pain. I spent time with the patient discussing all of the risks, benefits, and alternatives to this measure. Including but not limited to spinal infection, epidural hematoma/abscess, paralysis, nerve injury, steroid effects, and spinal headache. The patient understands and agrees to proceed.     His last injection was on RIGHT LTR at L5 and S1 on 6- and he reported 100% relief of his pain that is ONGOING for now.      FUV 3 months. Pt reports he is going to be in [place] from January 2024 to Feb 2024 for his cancer work up care.      I spent time with the patient reviewing their imaging and discussing the risks benefits and alternatives to the above plan. A total of 30 minutes was spent reviewing the data and greater than 50% of that time was with the patient during the face to face encounter discussing treatment options both surgical, non-surgical, and minimally invasive techniques.       Brien Tovar MD

## 2024-03-19 LAB
AMPHETAMINES UR QL SCN: ABNORMAL
BARBITURATES UR QL SCN: ABNORMAL
BZE UR QL SCN: ABNORMAL
CANNABINOIDS UR QL SCN: ABNORMAL
CREAT UR-MCNC: 407.2 MG/DL (ref 20–370)
PCP UR QL SCN: ABNORMAL

## 2024-03-21 LAB

## 2024-04-11 ENCOUNTER — OFFICE VISIT (OUTPATIENT)
Dept: PAIN MEDICINE | Facility: CLINIC | Age: 69
End: 2024-04-11
Payer: MEDICARE

## 2024-04-11 VITALS
HEART RATE: 54 BPM | TEMPERATURE: 97.7 F | SYSTOLIC BLOOD PRESSURE: 127 MMHG | RESPIRATION RATE: 15 BRPM | WEIGHT: 163 LBS | DIASTOLIC BLOOD PRESSURE: 62 MMHG | OXYGEN SATURATION: 97 % | BODY MASS INDEX: 26.31 KG/M2

## 2024-04-11 DIAGNOSIS — R51.9 PAIN, FACE: ICD-10-CM

## 2024-04-11 DIAGNOSIS — M54.81 BILATERAL OCCIPITAL NEURALGIA: ICD-10-CM

## 2024-04-11 DIAGNOSIS — Z79.891 LONG TERM (CURRENT) USE OF OPIATE ANALGESIC: ICD-10-CM

## 2024-04-11 DIAGNOSIS — M54.2 CHRONIC NECK PAIN: ICD-10-CM

## 2024-04-11 DIAGNOSIS — G89.29 CHRONIC NECK PAIN: ICD-10-CM

## 2024-04-11 DIAGNOSIS — M62.838 TRAPEZIUS MUSCLE SPASM: Primary | ICD-10-CM

## 2024-04-11 PROCEDURE — 99214 OFFICE O/P EST MOD 30 MIN: CPT | Performed by: ANESTHESIOLOGY

## 2024-04-11 ASSESSMENT — PAIN SCALES - GENERAL
PAINLEVEL: 7
PAINLEVEL_OUTOF10: 7

## 2024-04-11 ASSESSMENT — PAIN - FUNCTIONAL ASSESSMENT: PAIN_FUNCTIONAL_ASSESSMENT: 0-10

## 2024-04-11 ASSESSMENT — PAIN DESCRIPTION - DESCRIPTORS: DESCRIPTORS: SHARP

## 2024-04-11 ASSESSMENT — ENCOUNTER SYMPTOMS
LOSS OF SENSATION IN FEET: 0
OCCASIONAL FEELINGS OF UNSTEADINESS: 0

## 2024-04-11 NOTE — PROGRESS NOTES
History Of Present Illness  Chuck Tam is a 68 y.o. male presenting with   Chief Complaint   Patient presents with    Follow-up       Patient returns for ongoing head pain and was found to have a mass on exam. He reports he is going [place], [place] to have his mass worked up from JAN to FEB 2024.  He has a long hx of chronic occipital pain on the right side. He also has a history of facial pain, neck pain, headache pain, midback pain.      Recall: He is s/p revision facial surgery on 11-.      Recall:  He is s/p facial and jaw surgery on 1-3-19 for NORAH s/p trach placement and is now healing. He lost 20 lbs during the time his jaw was wired shut. He has a F/U appt on 1-25-19, 2-1-19 with Dr. Talley [facility]. He was inpatient from Jan 3rd -Jan 14th, 2019. He had a subsequent surgery with Dr. Talley 8-28-19 for additional jaw surgery.      The pain is constant, worse with activity and better with rest. The patient is s/p ACDF in [place] and another ACDF in [place]. He has a hx of Stroke, Craniotomy, and Prostate CA s/p prostatectomy. He did not require any chemo or radiation therapy.      The pain is pressure in his head and sharp, stabbing and shooting to the B/L shoulders. Denies LE paresthesias, weakness, saddle anesthesia, bowel or bladder incontinence. To manage this pain the patient has attempted Percocet 7.5/325mg three times a day he reports 90 pills has lasted him 2 months. He has tried Lyrica, Tramadol, Ibuprofen with no relief. The patient has undergone SNOW 2 years ago with > 50% relief.      The pt reports he quit Tob on Jan 2nd, 2019 since he wasn't able to inhale after his surgery with Dr. Melendez on Jan 3rd, 2019 to remove his oral hardware. However, he has restarted and is currently smoking 2ppd.      SocHx  Denies any drug use  Positive Tobacco 2ppd for many years   Positive EtOH     PAIN SCORE: 7/10 - in his head      Neuro: Juana Page  PCP: Dr. Pearce          Past Medical History  He has a past  medical history of Anxiety disorder, unspecified, Cervical disc disorder, unspecified, unspecified cervical region (12/14/2017), Obstructive sleep apnea (adult) (pediatric), Personal history of (healed) traumatic fracture, Personal history of malignant neoplasm of prostate, Personal history of malignant neoplasm of prostate (10/31/2018), Personal history of malignant neoplasm, unspecified, Personal history of other diseases of the circulatory system, Personal history of other diseases of the digestive system (12/14/2020), Personal history of other endocrine, nutritional and metabolic disease (02/22/2019), Personal history of transient ischemic attack (TIA), and cerebral infarction without residual deficits, and Trigeminal neuralgia (01/15/2021).    Surgical History  He has a past surgical history that includes Carpal tunnel release (02/25/2016); Shoulder surgery (02/25/2016); Other surgical history (09/07/2018); Other surgical history (01/22/2018); Other surgical history (06/04/2020); Elbow surgery (11/08/2017); Neck surgery (11/08/2017); and Cervical fusion (03/01/2021).     Social History  He reports that he has been smoking cigarettes. He has a 100 pack-year smoking history. He has never used smokeless tobacco. He reports that he does not drink alcohol and does not use drugs.    Family History  Family History   Problem Relation Name Age of Onset    COPD Mother      Other (OPCD (olivopontocerebellar degeneration)) Mother      Cancer Father      Lupus Sister          SLE (systemic lupus erthematosus related syndrome)        Allergies  Patient has no known allergies.    Review of Systems    All other systems reviewed and negative for any deficits. Pertinent positives and negatives were considered in the medical decision making process.        Physical Exam  /62   Pulse 54   Temp 36.5 °C (97.7 °F)   Resp 15   Wt 73.9 kg (163 lb)   SpO2 97%     General: Pt appears stated age      Eyes: Conjunctiva  non-icteric and lids without obvious rash or drooping. Pupils are symmetric     ENT: External ears and nose appear to be without deformity or rash. No lesions or masses noted. Hearing is grossly intact     Neck: No JVD noted, tracheal position midline.     Musculoskeletal: Gait is grossly normal     Digits/nails show no clubbing or cyanosis     Exam of muscles/joints/bones shows no gross atrophy and no abnormal/involuntary movements in the head/neckNo asymmetry or masses noted in the head/neck     Stability: no subluxation noted on movement of bilateral upper extremities or head/neck     Strength: 4/5 in RUE and 5/5 in LUE      Range of Motion: WNL      Sensation: dec to sharp touch in RUE     Cranial nerves 2-12 are grossly intact     Psychiatric: Pt is alert and oriented to time, place and person. No signs of opiate intoxication or withdrawal.     Assessment/Plan   1. Bilateral occipital neuralgia  Inject Trigger Point, 1 or 2            Diagnoses/Problems     · Chronic low back pain (724.2,338.29) (M54.50,G89.29)   · Cervical disc herniation (722.0) (M50.20)   · Cervical radiculitis (723.4) (M54.12)   · Chronic neck pain (723.1,338.29) (M54.2,G89.29)   · Lumbar disc herniation (722.10) (M51.26)   · Lumbar foraminal stenosis (724.02) (M48.061)   · Lumbar neuritis (724.4) (M54.16)   · Tobacco abuse counseling (V65.42,305.1) (Z71.6)   · Balance problem (781.99) (R26.89)       Provider Impressions        1. I have previously provided the patient with a list of physical therapy exercises to learn and perform.     2. The patient is a candidate for a SNOW at C7/T1 VERSUS TRIGGER POINT INJECTION vs SNOW C7/T1 to treat back and radicular pain. I spent time with the patient discussing all of the risks, benefits, and alternatives to this measure. Including but not limited to spinal infection, epidural hematoma/abscess, paralysis, nerve injury, steroid effects, and spinal headache. The patient understands and agrees to  proceed as needed.        3. Recall: The pt was previously taking Gabapentin to help with nerve related pain. However, he developed GI distress after taking this medication. I did start him on Topamax instead, however, he was only taking this once a day and did not follow the directions. This was reviewed with him in detail.      He also has a hx of Trigeminal neuralgia as well.     4. I once again advised the patient to quit tobacco as it worsens chronic pain, disc health, and can increase the risk of complications and poor healing with any procedure. Tobacco also causes a whole host of other deadly diseases. I informed the patient that the single most important thing they can do to benefit their health would be to quit tobacco.     He was able to quit for 3 months after his Jaw surgery from Jan - April 2019. HOwever, he restarted. Pt was given information to help him quit.      5. We did discuss the risks, benefits, and alternatives to chronic opioid therapy and that usage can be a danger to life. We also discussed proper and safe storage of medication to prevent unauthorized usage. The patient understands and will use the medicine responsibly.     We did check a urine screen on 2-22-19 and it was appropriately positive for Tramadol and Oxycodone.      Pt signed an opiate contract on 5- and again on 4- and it was reviewed line by line.   Pt provided a UDS on 5- and it was appropriate.      CSA completed 4-, 3-, 3-  UDS on 4-, 3-  OARRS reviewed at his visit.   ORT completed on 1-  Narcan prescribed 8-, 5-      Pt was previously also given a prescription for Narcan. We again discussed how to use this medication in detail as well as the risks, benefits, and side effects.      6. I extensively reviewed the patients MRI findings in detail, including review of the actual images and provided a detailed explanation of the findings using a spine  model.      There is a disc herniation at the L5/S1 with severe foraminal stenosis on the right side at the L5 level.      7. The patient is a candidate for an A RIGHT LTR AT L5 AND S1 and occipital block to treat back and radicular pain. I spent time with the patient discussing all of the risks, benefits, and alternatives to this measure. Including but not limited to spinal infection, epidural hematoma/abscess, paralysis, nerve injury, steroid effects, and spinal headache. The patient understands and agrees to proceed.     His last injection was on RIGHT LTR at L5 and S1 on 6- and he reported 100% relief of his pain that is ONGOING for now.      FUV 3 months. Pt reports he is going to be in [place] from January 2024 to Feb 2024 for his cancer work up care.      I spent time with the patient reviewing their imaging and discussing the risks benefits and alternatives to the above plan. A total of 30 minutes was spent reviewing the data and greater than 50% of that time was with the patient during the face to face encounter discussing treatment options both surgical, non-surgical, and minimally invasive techniques.       Brien Tovar MD    Date of Procedure: 4/11/2024    Preopoerative Dx: RIGHT trapezius muscle spasm  Postoperative Dx: Same as above    Procedure: RIGHT Trapezius muscle steroid injection    Complications: None    EBL: None    Procedure:    The pt was identified in the procedure room. After risks benefits and alternatives were discussed informed consent was obtained. A timeout was performed and allergies verified. The pt was placed in the sitting position and the TRAPEZIUS muscle were prepped and draped in the usual sterile fashion.    A 1.5 inch long 22g needle was advanced into the painful area of the muscles on the RIGHT SIDE. After negative aspiration for heme a total of 5ml of 0.5% Lidocaine and 40mg of Kenalog was injected.     The pt tolerated the procedure well and was discharged home in  good condition.

## 2024-04-15 DIAGNOSIS — M54.2 CHRONIC NECK PAIN: ICD-10-CM

## 2024-04-15 DIAGNOSIS — G89.29 CHRONIC NECK PAIN: ICD-10-CM

## 2024-04-15 RX ORDER — OXYCODONE AND ACETAMINOPHEN 5; 325 MG/1; MG/1
1 TABLET ORAL EVERY 8 HOURS PRN
Qty: 90 TABLET | Refills: 0 | Status: SHIPPED | OUTPATIENT
Start: 2024-04-15 | End: 2024-04-15

## 2024-05-06 DIAGNOSIS — M54.2 CHRONIC NECK PAIN: ICD-10-CM

## 2024-05-06 DIAGNOSIS — G89.29 CHRONIC NECK PAIN: ICD-10-CM

## 2024-05-06 RX ORDER — OXYCODONE AND ACETAMINOPHEN 5; 325 MG/1; MG/1
1 TABLET ORAL EVERY 8 HOURS PRN
Qty: 90 TABLET | Refills: 0 | Status: SHIPPED | OUTPATIENT
Start: 2024-05-15 | End: 2024-06-06 | Stop reason: SDUPTHER

## 2024-06-06 ENCOUNTER — TELEPHONE (OUTPATIENT)
Dept: PAIN MEDICINE | Facility: CLINIC | Age: 69
End: 2024-06-06
Payer: MEDICARE

## 2024-06-06 DIAGNOSIS — G89.29 CHRONIC NECK PAIN: ICD-10-CM

## 2024-06-06 DIAGNOSIS — M54.2 CHRONIC NECK PAIN: ICD-10-CM

## 2024-06-06 RX ORDER — OXYCODONE AND ACETAMINOPHEN 5; 325 MG/1; MG/1
1 TABLET ORAL EVERY 8 HOURS PRN
Qty: 90 TABLET | Refills: 0 | Status: SHIPPED | OUTPATIENT
Start: 2024-06-13 | End: 2024-07-13

## 2024-06-14 ENCOUNTER — APPOINTMENT (OUTPATIENT)
Dept: PAIN MEDICINE | Facility: CLINIC | Age: 69
End: 2024-06-14
Payer: COMMERCIAL

## 2024-07-02 ENCOUNTER — APPOINTMENT (OUTPATIENT)
Dept: PRIMARY CARE | Facility: CLINIC | Age: 69
End: 2024-07-02
Payer: MEDICARE

## 2024-07-02 VITALS
WEIGHT: 167.8 LBS | HEART RATE: 66 BPM | HEIGHT: 66 IN | BODY MASS INDEX: 26.97 KG/M2 | SYSTOLIC BLOOD PRESSURE: 112 MMHG | OXYGEN SATURATION: 96 % | DIASTOLIC BLOOD PRESSURE: 58 MMHG | TEMPERATURE: 97.7 F

## 2024-07-02 DIAGNOSIS — I69.959 HEMIPARESIS AS LATE EFFECT OF CEREBROVASCULAR DISEASE, UNSPECIFIED CEREBROVASCULAR DISEASE TYPE, UNSPECIFIED LATERALITY (MULTI): ICD-10-CM

## 2024-07-02 DIAGNOSIS — F17.200 CURRENT SMOKER: ICD-10-CM

## 2024-07-02 DIAGNOSIS — G47.33 OSA (OBSTRUCTIVE SLEEP APNEA): ICD-10-CM

## 2024-07-02 DIAGNOSIS — C61 PROSTATE CA (MULTI): ICD-10-CM

## 2024-07-02 DIAGNOSIS — E78.5 DYSLIPIDEMIA: ICD-10-CM

## 2024-07-02 DIAGNOSIS — F17.200 NICOTINE DEPENDENCE, UNCOMPLICATED, UNSPECIFIED NICOTINE PRODUCT TYPE: ICD-10-CM

## 2024-07-02 DIAGNOSIS — E03.9 HYPOTHYROIDISM, UNSPECIFIED: ICD-10-CM

## 2024-07-02 DIAGNOSIS — Z12.5 PROSTATE CANCER SCREENING: Primary | ICD-10-CM

## 2024-07-02 DIAGNOSIS — I10 PRIMARY HYPERTENSION: ICD-10-CM

## 2024-07-02 DIAGNOSIS — R79.9 ABNORMAL FINDING OF BLOOD CHEMISTRY, UNSPECIFIED: ICD-10-CM

## 2024-07-02 PROBLEM — J01.90 ACUTE SINUSITIS: Status: RESOLVED | Noted: 2023-02-16 | Resolved: 2024-07-02

## 2024-07-02 PROBLEM — J40 BRONCHITIS: Status: RESOLVED | Noted: 2023-04-24 | Resolved: 2024-07-02

## 2024-07-02 PROBLEM — E66.9 OBESITY (BMI 30-39.9): Status: RESOLVED | Noted: 2023-02-16 | Resolved: 2024-07-02

## 2024-07-02 PROBLEM — R05.9 COUGH: Status: RESOLVED | Noted: 2023-02-16 | Resolved: 2024-07-02

## 2024-07-02 PROBLEM — F32.A DEPRESSION: Status: RESOLVED | Noted: 2023-02-16 | Resolved: 2024-07-02

## 2024-07-02 PROBLEM — J06.9 URI (UPPER RESPIRATORY INFECTION): Status: RESOLVED | Noted: 2023-04-24 | Resolved: 2024-07-02

## 2024-07-02 PROBLEM — J06.9 VIRAL URI WITH COUGH: Status: RESOLVED | Noted: 2023-02-16 | Resolved: 2024-07-02

## 2024-07-02 PROBLEM — K04.7 DENTAL ABSCESS: Status: RESOLVED | Noted: 2023-02-16 | Resolved: 2024-07-02

## 2024-07-02 PROBLEM — J98.01 BRONCHOSPASM, ACUTE: Status: RESOLVED | Noted: 2023-02-16 | Resolved: 2024-07-02

## 2024-07-02 PROBLEM — J06.9 ACUTE URI: Status: RESOLVED | Noted: 2023-04-24 | Resolved: 2024-07-02

## 2024-07-02 PROBLEM — L30.9 ECZEMA: Status: RESOLVED | Noted: 2023-02-16 | Resolved: 2024-07-02

## 2024-07-02 PROBLEM — H66.90 OTITIS MEDIA, ACUTE: Status: RESOLVED | Noted: 2023-02-16 | Resolved: 2024-07-02

## 2024-07-02 PROBLEM — S22.41XD CLOSED FRACTURE OF MULTIPLE RIBS OF RIGHT SIDE WITH ROUTINE HEALING, SUBSEQUENT ENCOUNTER: Status: RESOLVED | Noted: 2023-02-16 | Resolved: 2024-07-02

## 2024-07-02 PROBLEM — B37.0 ORAL YEAST INFECTION: Status: RESOLVED | Noted: 2023-02-16 | Resolved: 2024-07-02

## 2024-07-02 PROBLEM — J20.9 ACUTE BRONCHITIS: Status: RESOLVED | Noted: 2023-02-16 | Resolved: 2024-07-02

## 2024-07-02 PROBLEM — H61.23 BILATERAL IMPACTED CERUMEN: Status: RESOLVED | Noted: 2023-02-16 | Resolved: 2024-07-02

## 2024-07-02 PROBLEM — M54.16 BILATERAL LUMBAR RADICULOPATHY: Status: RESOLVED | Noted: 2023-04-24 | Resolved: 2024-07-02

## 2024-07-02 PROBLEM — F33.41 RECURRENT MAJOR DEPRESSIVE DISORDER, IN PARTIAL REMISSION (CMS-HCC): Status: RESOLVED | Noted: 2023-10-13 | Resolved: 2024-07-02

## 2024-07-02 PROBLEM — R53.83 FATIGUE: Status: RESOLVED | Noted: 2023-02-16 | Resolved: 2024-07-02

## 2024-07-02 LAB
ALBUMIN SERPL BCP-MCNC: 4.4 G/DL (ref 3.4–5)
ALP SERPL-CCNC: 66 U/L (ref 33–136)
ALT SERPL W P-5'-P-CCNC: 31 U/L (ref 10–52)
ANION GAP SERPL CALC-SCNC: 13 MMOL/L (ref 10–20)
AST SERPL W P-5'-P-CCNC: 32 U/L (ref 9–39)
BILIRUB SERPL-MCNC: 0.7 MG/DL (ref 0–1.2)
BUN SERPL-MCNC: 12 MG/DL (ref 6–23)
CALCIUM SERPL-MCNC: 9.7 MG/DL (ref 8.6–10.6)
CHLORIDE SERPL-SCNC: 104 MMOL/L (ref 98–107)
CHOLEST SERPL-MCNC: 84 MG/DL (ref 0–199)
CHOLESTEROL/HDL RATIO: 1.7
CO2 SERPL-SCNC: 25 MMOL/L (ref 21–32)
CREAT SERPL-MCNC: 0.79 MG/DL (ref 0.5–1.3)
EGFRCR SERPLBLD CKD-EPI 2021: >90 ML/MIN/1.73M*2
ERYTHROCYTE [DISTWIDTH] IN BLOOD BY AUTOMATED COUNT: 14.7 % (ref 11.5–14.5)
EST. AVERAGE GLUCOSE BLD GHB EST-MCNC: 105 MG/DL
GLUCOSE SERPL-MCNC: 93 MG/DL (ref 74–99)
HBA1C MFR BLD: 5.3 %
HCT VFR BLD AUTO: 41.7 % (ref 41–52)
HDLC SERPL-MCNC: 50.5 MG/DL
HGB BLD-MCNC: 13.7 G/DL (ref 13.5–17.5)
LDLC SERPL CALC-MCNC: 24 MG/DL
MCH RBC QN AUTO: 31.2 PG (ref 26–34)
MCHC RBC AUTO-ENTMCNC: 32.9 G/DL (ref 32–36)
MCV RBC AUTO: 95 FL (ref 80–100)
NON HDL CHOLESTEROL: 34 MG/DL (ref 0–149)
NRBC BLD-RTO: 0 /100 WBCS (ref 0–0)
PLATELET # BLD AUTO: 243 X10*3/UL (ref 150–450)
POTASSIUM SERPL-SCNC: 4.3 MMOL/L (ref 3.5–5.3)
PROT SERPL-MCNC: 6.6 G/DL (ref 6.4–8.2)
PSA SERPL-MCNC: <0.1 NG/ML
RBC # BLD AUTO: 4.39 X10*6/UL (ref 4.5–5.9)
SODIUM SERPL-SCNC: 138 MMOL/L (ref 136–145)
TRIGL SERPL-MCNC: 46 MG/DL (ref 0–149)
TSH SERPL-ACNC: 7.33 MIU/L (ref 0.44–3.98)
VLDL: 9 MG/DL (ref 0–40)
WBC # BLD AUTO: 6.9 X10*3/UL (ref 4.4–11.3)

## 2024-07-02 PROCEDURE — 99406 BEHAV CHNG SMOKING 3-10 MIN: CPT | Performed by: STUDENT IN AN ORGANIZED HEALTH CARE EDUCATION/TRAINING PROGRAM

## 2024-07-02 PROCEDURE — 85027 COMPLETE CBC AUTOMATED: CPT

## 2024-07-02 PROCEDURE — 99204 OFFICE O/P NEW MOD 45 MIN: CPT | Performed by: STUDENT IN AN ORGANIZED HEALTH CARE EDUCATION/TRAINING PROGRAM

## 2024-07-02 PROCEDURE — 1126F AMNT PAIN NOTED NONE PRSNT: CPT | Performed by: STUDENT IN AN ORGANIZED HEALTH CARE EDUCATION/TRAINING PROGRAM

## 2024-07-02 PROCEDURE — 3074F SYST BP LT 130 MM HG: CPT | Performed by: STUDENT IN AN ORGANIZED HEALTH CARE EDUCATION/TRAINING PROGRAM

## 2024-07-02 PROCEDURE — 36415 COLL VENOUS BLD VENIPUNCTURE: CPT

## 2024-07-02 PROCEDURE — 3078F DIAST BP <80 MM HG: CPT | Performed by: STUDENT IN AN ORGANIZED HEALTH CARE EDUCATION/TRAINING PROGRAM

## 2024-07-02 PROCEDURE — 80061 LIPID PANEL: CPT

## 2024-07-02 PROCEDURE — 84443 ASSAY THYROID STIM HORMONE: CPT

## 2024-07-02 PROCEDURE — 80053 COMPREHEN METABOLIC PANEL: CPT

## 2024-07-02 PROCEDURE — 1160F RVW MEDS BY RX/DR IN RCRD: CPT | Performed by: STUDENT IN AN ORGANIZED HEALTH CARE EDUCATION/TRAINING PROGRAM

## 2024-07-02 PROCEDURE — G2211 COMPLEX E/M VISIT ADD ON: HCPCS | Performed by: STUDENT IN AN ORGANIZED HEALTH CARE EDUCATION/TRAINING PROGRAM

## 2024-07-02 PROCEDURE — 83036 HEMOGLOBIN GLYCOSYLATED A1C: CPT

## 2024-07-02 PROCEDURE — G0103 PSA SCREENING: HCPCS

## 2024-07-02 PROCEDURE — 1159F MED LIST DOCD IN RCRD: CPT | Performed by: STUDENT IN AN ORGANIZED HEALTH CARE EDUCATION/TRAINING PROGRAM

## 2024-07-02 RX ORDER — LEVOTHYROXINE SODIUM 100 UG/1
TABLET ORAL
Qty: 90 TABLET | Refills: 3 | Status: CANCELLED | OUTPATIENT
Start: 2024-07-02

## 2024-07-02 ASSESSMENT — ENCOUNTER SYMPTOMS
LOSS OF SENSATION IN FEET: 0
OCCASIONAL FEELINGS OF UNSTEADINESS: 0
DEPRESSION: 0

## 2024-07-02 ASSESSMENT — PATIENT HEALTH QUESTIONNAIRE - PHQ9
SUM OF ALL RESPONSES TO PHQ9 QUESTIONS 1 AND 2: 0
1. LITTLE INTEREST OR PLEASURE IN DOING THINGS: NOT AT ALL
2. FEELING DOWN, DEPRESSED OR HOPELESS: NOT AT ALL

## 2024-07-02 ASSESSMENT — PAIN SCALES - GENERAL: PAINLEVEL: 0-NO PAIN

## 2024-07-02 NOTE — PROGRESS NOTES
Subjective   Patient ID: Chuck Tam is a 68 y.o. male who presents for New Patient Visit (Establish care ) and Balance Problem (Having trouble with balance).  HPI  Chuck is here to establish care.    He reports worsening balance over the last ~6-9 months. ~2 falls in the last 6 months. He was knocked over by his puppy and landed on his right leg and buttocks ~3 days ago. No pain since that time. No head injury. No lightheadedness leading up to the fall, he just was unable to regain his balance when his puppy ran into his legs. He is following with Dr. Tovar for head pain, gets regular trigger point injections and prescribes him Percocet PRN.     PMHx: Hypothyroidism  SurgHx: Craniotomy x2 (~17 years ago), cervical fusion  FamHx: COPD - mother  SocialHx: Smoking ~2ppd. Never vaped. Former EtOH use. No drug use. . Lives with his youngest daughter currently. His other daughter is living in [place]. 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    Review of Systems  12-point ROS was reviewed and is negative, unless otherwise noted in HPI    Objective   Vitals:    07/02/24 1125   BP: 112/58   Pulse: 66   Temp: 36.5 °C (97.7 °F)   SpO2: 96%      Physical Exam  GEN: alert, conversant, NAD  HEENT: PERRL, EOMI, MMM, Tms pearly gray bilaterally  NECK: supple, no LAD appreciated  CHEST: CTAB  CV: S1, S2, RRR, no murmurs appreciated  ABD: soft, NT, ND  EXT: no significant LE edema  SKIN: warm, dry    Assessment/Plan   #hx of trigeminal neuralgia  #hx of CVA w/ residual right sided hemiparesis  #hx of craniotomy x2  #cervical stenosis  #chronic head/neck pain  #acute on chronic dysequilibrium/ambulatory dysfunction  - Previously seen by Neurology, Neurosurgery  Following regularly with Pain Management for trigger point injections and uses Percocet PRN  - offered referral to PT/OT for gait training, strengthening, etc - patient declines  - offered imaging today, patient declines  - offered referral to  neurology/neurosurgery, patient declines    #hypothyroidism  - maintained on ~1100mcg levothyroxine weekly  Repeat TSH today  - adjust levothyroxine PRN    # Dyslipidemia  Stable.   Repeat Lipid panel today  Continue with current management: atorvastatin 10mg daily  Discussed healthy diet and lifestyle.    # HTN  Well controlled.  Continue current medications: Lisinopril 5mg daily  Discussed DASH diet and dietary sodium restrictions.   Increase dietary efforts and physical activity.     #Current smoker  - 2 ppd smoker. >100 pack year hx. Patient has not interest in quitting smoking.   - Advised cessation, counseled cessation tips, offer cessation aids  - Spent >5 minutes counseling smoking cessation  - Strongly advised LDCT, patient declines     #NORAH, not on CPAP  Has been followed by Neuro/sleep medicine in the past, has been evaluated for [facility] candidacy in the past as well  Unable to tolerate CPAP    #hx of prostate CA s/p prostatectomy    Health Maintenance:  Vaccines: COVID (x2), Flu (UTD), TDAP (2020), Shingles (advised), Pneumonia (advised), RSV (advised)  Screening: Colonoscopy (~16 years ago, declines continuing), LDCT (declines)  Labs: Obtain today     RTC in 3-4 months for medicare wellness visit, or sooner PRN    Rodger Brorego,

## 2024-07-03 ENCOUNTER — TELEPHONE (OUTPATIENT)
Dept: PRIMARY CARE | Facility: CLINIC | Age: 69
End: 2024-07-03
Payer: MEDICARE

## 2024-07-03 DIAGNOSIS — E03.9 HYPOTHYROIDISM, UNSPECIFIED TYPE: Primary | ICD-10-CM

## 2024-07-03 DIAGNOSIS — E78.5 DYSLIPIDEMIA: ICD-10-CM

## 2024-07-03 DIAGNOSIS — I10 ESSENTIAL (PRIMARY) HYPERTENSION: ICD-10-CM

## 2024-07-03 RX ORDER — ATORVASTATIN CALCIUM 10 MG/1
10 TABLET, FILM COATED ORAL DAILY
Qty: 90 TABLET | Refills: 3 | Status: SHIPPED | OUTPATIENT
Start: 2024-07-03 | End: 2025-06-28

## 2024-07-03 RX ORDER — LEVOTHYROXINE SODIUM 175 UG/1
175 TABLET ORAL DAILY
Qty: 90 TABLET | Refills: 3 | Status: SHIPPED | OUTPATIENT
Start: 2024-07-03 | End: 2025-07-03

## 2024-07-03 RX ORDER — LISINOPRIL 5 MG/1
5 TABLET ORAL DAILY
Qty: 90 TABLET | Refills: 3 | Status: SHIPPED | OUTPATIENT
Start: 2024-07-03

## 2024-07-03 NOTE — TELEPHONE ENCOUNTER
Result Communication    Resulted Orders   CBC   Result Value Ref Range    WBC 6.9 4.4 - 11.3 x10*3/uL    nRBC 0.0 0.0 - 0.0 /100 WBCs    RBC 4.39 (L) 4.50 - 5.90 x10*6/uL    Hemoglobin 13.7 13.5 - 17.5 g/dL    Hematocrit 41.7 41.0 - 52.0 %    MCV 95 80 - 100 fL    MCH 31.2 26.0 - 34.0 pg    MCHC 32.9 32.0 - 36.0 g/dL    RDW 14.7 (H) 11.5 - 14.5 %    Platelets 243 150 - 450 x10*3/uL   Comprehensive Metabolic Panel   Result Value Ref Range    Glucose 93 74 - 99 mg/dL    Sodium 138 136 - 145 mmol/L    Potassium 4.3 3.5 - 5.3 mmol/L    Chloride 104 98 - 107 mmol/L    Bicarbonate 25 21 - 32 mmol/L    Anion Gap 13 10 - 20 mmol/L    Urea Nitrogen 12 6 - 23 mg/dL    Creatinine 0.79 0.50 - 1.30 mg/dL    eGFR >90 >60 mL/min/1.73m*2      Comment:      Calculations of estimated GFR are performed using the 2021 CKD-EPI Study Refit equation without the race variable for the IDMS-Traceable creatinine methods.  https://jasn.asnjournals.org/content/early/2021/09/22/ASN.2201188345    Calcium 9.7 8.6 - 10.6 mg/dL    Albumin 4.4 3.4 - 5.0 g/dL    Alkaline Phosphatase 66 33 - 136 U/L    Total Protein 6.6 6.4 - 8.2 g/dL    AST 32 9 - 39 U/L    Bilirubin, Total 0.7 0.0 - 1.2 mg/dL    ALT 31 10 - 52 U/L      Comment:      Patients treated with Sulfasalazine may generate falsely decreased results for ALT.   Hemoglobin A1C   Result Value Ref Range    Hemoglobin A1C 5.3 see below %    Estimated Average Glucose 105 Not Established mg/dL    Narrative    Diagnosis of Diabetes-Adults  Non-Diabetic: < or = 5.6%  Increased risk for developing diabetes: 5.7-6.4%  Diagnostic of diabetes: > or = 6.5%       Lipid Panel   Result Value Ref Range    Cholesterol 84 0 - 199 mg/dL      Comment:            Age      Desirable   Borderline High   High     0-19 Y     0 - 169       170 - 199     >/= 200    20-24 Y     0 - 189       190 - 224     >/= 225         >24 Y     0 - 199       200 - 239     >/= 240   **All ranges are based on fasting samples. Specific    therapeutic targets will vary based on patient-specific   cardiac risk.    Pediatric guidelines reference:Pediatrics 2011, 128(S5).Adult guidelines reference: NCEP ATPIII Guidelines,GENARO 2001, 258:2486-97    Venipuncture immediately after or during the administration of Metamizole may lead to falsely low results. Testing should be performed immediately prior to Metamizole dosing.    HDL-Cholesterol 50.5 mg/dL      Comment:        Age       Very Low   Low     Normal    High    0-19 Y    < 35      < 40     40-45     ----  20-24 Y    ----     < 40      >45      ----        >24 Y      ----     < 40     40-60      >60      Cholesterol/HDL Ratio 1.7       Comment:        Ref Values  Desirable  < 3.4  High Risk  > 5.0    LDL Calculated 24 <=99 mg/dL      Comment:                                  Near   Borderline      AGE      Desirable  Optimal    High     High     Very High     0-19 Y     0 - 109     ---    110-129   >/= 130     ----    20-24 Y     0 - 119     ---    120-159   >/= 160     ----      >24 Y     0 -  99   100-129  130-159   160-189     >/=190      VLDL 9 0 - 40 mg/dL    Triglycerides 46 0 - 149 mg/dL      Comment:         Age         Desirable   Borderline High   High     Very High   0 D-90 D    19 - 174         ----         ----        ----  91 D- 9 Y     0 -  74        75 -  99     >/= 100      ----    10-19 Y     0 -  89        90 - 129     >/= 130      ----    20-24 Y     0 - 114       115 - 149     >/= 150      ----         >24 Y     0 - 149       150 - 199    200- 499    >/= 500    Venipuncture immediately after or during the administration of Metamizole may lead to falsely low results. Testing should be performed immediately prior to Metamizole dosing.    Non HDL Cholesterol 34 0 - 149 mg/dL      Comment:            Age       Desirable   Borderline High   High     Very High     0-19 Y     0 - 119       120 - 144     >/= 145    >/= 160    20-24 Y     0 - 149       150 - 189     >/= 190      ----          >24 Y    30 mg/dL above LDL Cholesterol goal     Prostate Specific Antigen   Result Value Ref Range    Prostate Specific AG <0.10 <=4.00 ng/mL    Narrative    The FDA requires that the method used for PSA assay be reported to the physician. Values obtained with different assay methods must not be used interchangeably. This test was performed at [facility] using [employer] PSA method, which is a sandwich immunoassay using chemiluminescence for quantitation. The assay is approved for measurement of prostate-specific antigen (PSA) in serum and may be used in conjunction with a digital rectal examination in men 50 years and older as an aid in the detection of prostate cancer. 5 Alpha-reductase inhibitors (e.g., Proscar, Finasteride, Avodart, Dutasteride, and Cara) for the treatment of BPH have been shown to lower PSA levels by an average of 50% after 6 months of treatment.        TSH   Result Value Ref Range    Thyroid Stimulating Hormone 7.33 (H) 0.44 - 3.98 mIU/L    Narrative    TSH testing is performed using different testing methodology at [facility] than at other [facility]. Direct result comparisons should only be made within the same method.         4:17 PM    Called to discuss lab testing. We will change levothyroxine regimen, switch to 175mcg tablet daily. Increase from 1100mcg weekly to 1225mcg weekly. Repeat TSH at next visit.

## 2024-07-11 ENCOUNTER — OFFICE VISIT (OUTPATIENT)
Dept: PAIN MEDICINE | Facility: CLINIC | Age: 69
End: 2024-07-11
Payer: MEDICARE

## 2024-07-11 VITALS
HEART RATE: 52 BPM | HEIGHT: 66 IN | WEIGHT: 165 LBS | RESPIRATION RATE: 18 BRPM | OXYGEN SATURATION: 99 % | BODY MASS INDEX: 26.52 KG/M2 | DIASTOLIC BLOOD PRESSURE: 64 MMHG | TEMPERATURE: 97.2 F | SYSTOLIC BLOOD PRESSURE: 137 MMHG

## 2024-07-11 DIAGNOSIS — Z71.6 TOBACCO ABUSE COUNSELING: ICD-10-CM

## 2024-07-11 DIAGNOSIS — G89.29 CHRONIC LEFT SHOULDER PAIN: ICD-10-CM

## 2024-07-11 DIAGNOSIS — M54.2 CHRONIC NECK PAIN: ICD-10-CM

## 2024-07-11 DIAGNOSIS — G89.29 CHRONIC CERVICAL PAIN: ICD-10-CM

## 2024-07-11 DIAGNOSIS — M25.512 CHRONIC LEFT SHOULDER PAIN: ICD-10-CM

## 2024-07-11 DIAGNOSIS — G89.29 CHRONIC NECK PAIN: ICD-10-CM

## 2024-07-11 DIAGNOSIS — M54.81 BILATERAL OCCIPITAL NEURALGIA: Primary | ICD-10-CM

## 2024-07-11 DIAGNOSIS — M50.20 CERVICAL DISC HERNIATION: ICD-10-CM

## 2024-07-11 DIAGNOSIS — M54.2 CHRONIC CERVICAL PAIN: ICD-10-CM

## 2024-07-11 DIAGNOSIS — M54.12 CERVICAL RADICULITIS: ICD-10-CM

## 2024-07-11 PROCEDURE — 99214 OFFICE O/P EST MOD 30 MIN: CPT | Performed by: ANESTHESIOLOGY

## 2024-07-11 RX ORDER — OXYCODONE AND ACETAMINOPHEN 5; 325 MG/1; MG/1
1 TABLET ORAL EVERY 8 HOURS PRN
Qty: 90 TABLET | Refills: 0 | Status: SHIPPED | OUTPATIENT
Start: 2024-07-12 | End: 2024-08-11

## 2024-07-11 ASSESSMENT — PAIN SCALES - GENERAL
PAINLEVEL_OUTOF10: 7
PAINLEVEL: 7

## 2024-07-11 ASSESSMENT — ENCOUNTER SYMPTOMS
OCCASIONAL FEELINGS OF UNSTEADINESS: 1
LOSS OF SENSATION IN FEET: 0
DEPRESSION: 0

## 2024-07-11 ASSESSMENT — PAIN - FUNCTIONAL ASSESSMENT: PAIN_FUNCTIONAL_ASSESSMENT: 0-10

## 2024-07-11 ASSESSMENT — PAIN DESCRIPTION - DESCRIPTORS: DESCRIPTORS: ACHING;PRESSURE

## 2024-07-11 NOTE — PROGRESS NOTES
Pt is a follow up visit and is complaining of head and neck pain. Pt states his pain level is a 7/10 on the pain scale. Pt has had neck surgery. Pt denies hip or knee pain.

## 2024-07-11 NOTE — PROGRESS NOTES
History Of Present Illness  Chuck Tam is a 68 y.o. male presenting with   Chief Complaint   Patient presents with    Follow-up       Patient follows up for chronic head pain and facial pain, neck pain, headache pain, midback pain.      Recall: He is s/p revision facial surgery on 11-.      Recall:  He is s/p facial and jaw surgery on 1-3-19 for NORAH s/p trach placement and is now healing. He lost 20 lbs during the time his jaw was wired shut. He has a F/U appt on 1-25-19, 2-1-19 [place] [facility] [place] from Jan [place] -Jan 14th, 2019. He had a subsequent surgery with Dr. Talley 8-28-19 for additional jaw surgery.      The pain is constant, worse with activity and better with rest. The patient is s/p ACDF in [place] and another ACDF in [place]. He has a hx of Stroke, Craniotomy, and Prostate CA s/p prostatectomy. He did not require any chemo or radiation therapy.      The pain is pressure in his head and sharp, stabbing and shooting to the B/L shoulders. Denies LE paresthesias, weakness, saddle anesthesia, bowel or bladder incontinence. To manage this pain the patient has attempted Percocet 7.5/325mg three times a day he reports 90 pills has lasted him 2 months. He has tried Lyrica, Tramadol, Ibuprofen with no relief. The patient has undergone SNOW 2 years ago with > 50% relief.      The pt reports he quit Tob on Jan 2nd, 2019 since he wasn't able to inhale after his surgery with Dr. Melendez on Jan 3rd, 2019 to remove his oral hardware. However, he has restarted and is currently smoking 2ppd.      SocHx  Denies any drug use  Positive Tobacco 2ppd for many years   Positive EtOH     PAIN SCORE: 7/10 - in his head      Neuro: Juana Page  PCP: Dr. Pearce          Past Medical History  He has a past medical history of Anxiety disorder, unspecified, Bilateral lumbar radiculopathy (04/24/2023), Cervical disc disorder, unspecified, unspecified cervical region (12/14/2017), Closed fracture of multiple  ribs of right side with routine healing, subsequent encounter (02/16/2023), Depression (02/16/2023), Obstructive sleep apnea (adult) (pediatric), Oral yeast infection (02/16/2023), Personal history of (healed) traumatic fracture, Personal history of malignant neoplasm of prostate, Personal history of malignant neoplasm of prostate (10/31/2018), Personal history of malignant neoplasm, unspecified, Personal history of other diseases of the circulatory system, Personal history of other diseases of the digestive system (12/14/2020), Personal history of other endocrine, nutritional and metabolic disease (02/22/2019), Personal history of transient ischemic attack (TIA), and cerebral infarction without residual deficits, and Trigeminal neuralgia (01/15/2021).    Surgical History  He has a past surgical history that includes Carpal tunnel release (02/25/2016); Shoulder surgery (02/25/2016); Other surgical history (09/07/2018); Other surgical history (01/22/2018); Other surgical history (06/04/2020); Elbow surgery (11/08/2017); Neck surgery (11/08/2017); and Cervical fusion (03/01/2021).     Social History  He reports that he has been smoking cigarettes. He has a 100 pack-year smoking history. He has never used smokeless tobacco. He reports that he does not drink alcohol and does not use drugs.    Family History  Family History   Problem Relation Name Age of Onset    COPD Mother      Other (OPCD (olivopontocerebellar degeneration)) Mother      Cancer Father      Lupus Sister          SLE (systemic lupus erthematosus related syndrome)        Allergies  Patient has no known allergies.    Review of Systems    All other systems reviewed and negative for any deficits. Pertinent positives and negatives were considered in the medical decision making process.        Physical Exam  /64   Pulse 52   Temp 36.2 °C (97.2 °F)   Resp 18   Wt 74.8 kg (165 lb)   SpO2 99%     General: Pt appears stated age      Eyes: Conjunctiva  non-icteric and lids without obvious rash or drooping. Pupils are symmetric     ENT: External ears and nose appear to be without deformity or rash. No lesions or masses noted. Hearing is grossly intact     Neck: No JVD noted, tracheal position midline.     Musculoskeletal: Gait is grossly normal     Digits/nails show no clubbing or cyanosis     Exam of muscles/joints/bones shows no gross atrophy and no abnormal/involuntary movements in the head/neckNo asymmetry or masses noted in the head/neck     Stability: no subluxation noted on movement of bilateral upper extremities or head/neck     Strength: 4/5 in RUE and 5/5 in LUE      Range of Motion: WNL      Sensation: dec to sharp touch in RUE     Cranial nerves 2-12 are grossly intact     Psychiatric: Pt is alert and oriented to time, place and person. No signs of opiate intoxication or withdrawal.     Assessment/Plan     1. Bilateral occipital neuralgia        2. Cervical disc herniation        3. Cervical radiculitis        4. Chronic left shoulder pain        5. Chronic cervical pain                Diagnoses/Problems     · Chronic low back pain (724.2,338.29) (M54.50,G89.29)   · Cervical disc herniation (722.0) (M50.20)   · Cervical radiculitis (723.4) (M54.12)   · Chronic neck pain (723.1,338.29) (M54.2,G89.29)   · Lumbar disc herniation (722.10) (M51.26)   · Lumbar foraminal stenosis (724.02) (M48.061)   · Lumbar neuritis (724.4) (M54.16)   · Tobacco abuse counseling (V65.42,305.1) (Z71.6)   · Balance problem (781.99) (R26.89)       Provider Impressions        1. I have previously provided the patient with a list of physical therapy exercises to learn and perform.     2. The patient is a candidate for a SNOW at C7/T1 VERSUS TRIGGER POINT INJECTION vs SNOW C7/T1 to treat back and radicular pain. I spent time with the patient discussing all of the risks, benefits, and alternatives to this measure. Including but not limited to spinal infection, epidural  hematoma/abscess, paralysis, nerve injury, steroid effects, and spinal headache. The patient understands and agrees to proceed as needed.        3. Recall: The pt was previously taking Gabapentin to help with nerve related pain. However, he developed GI distress after taking this medication. I did start him on Topamax instead, however, he was only taking this once a day and did not follow the directions. This was reviewed with him in detail.      He also has a hx of Trigeminal neuralgia as well.     4. I once again advised the patient to quit tobacco as it worsens chronic pain, disc health, and can increase the risk of complications and poor healing with any procedure. Tobacco also causes a whole host of other deadly diseases. I informed the patient that the single most important thing they can do to benefit their health would be to quit tobacco.     He was able to quit for 3 months after his Jaw surgery from Jan - April 2019. HOwever, he restarted. Pt was given information to help him quit.      5. We did discuss the risks, benefits, and alternatives to chronic opioid therapy and that usage can be a danger to life. We also discussed proper and safe storage of medication to prevent unauthorized usage. The patient understands and will use the medicine responsibly.     We did check a urine screen on 2-22-19 and it was appropriately positive for Tramadol and Oxycodone.      Pt signed an opiate contract on 5- and again on 4- and it was reviewed line by line.   Pt provided a UDS on 5- and it was appropriate.      CSA completed 4-, 3-, 3-  UDS on 4-, 3-  OARRS reviewed at his visit.   ORT completed on 1-  Narcan prescribed 8-, 5-      Pt was previously also given a prescription for Narcan. We again discussed how to use this medication in detail as well as the risks, benefits, and side effects.      6. I extensively reviewed the patients MRI  findings in detail, including review of the actual images and provided a detailed explanation of the findings using a spine model.      There is a disc herniation at the L5/S1 with severe foraminal stenosis on the right side at the L5 level.      7. The patient is a candidate for an A RIGHT LTR AT L5 AND S1 and occipital block to treat back and radicular pain. I spent time with the patient discussing all of the risks, benefits, and alternatives to this measure. Including but not limited to spinal infection, epidural hematoma/abscess, paralysis, nerve injury, steroid effects, and spinal headache. The patient understands and agrees to proceed.     His last injection was on RIGHT LTR at L5 and S1 on 6- and he reported 100% relief of his pain that is ONGOING for now.      FUV 3 months. Pt reports he is going to be in [place] from January 2024 to Feb 2024 for his cancer work up care.      I spent time with the patient reviewing their imaging and discussing the risks benefits and alternatives to the above plan. A total of 30 minutes was spent reviewing the data and greater than 50% of that time was with the patient during the face to face encounter discussing treatment options both surgical, non-surgical, and minimally invasive techniques.       Brien Tovar MD    Date of Procedure: 4-    Preopoerative Dx: RIGHT trapezius muscle spasm  Postoperative Dx: Same as above    Procedure: RIGHT Trapezius muscle steroid injection    Complications: None    EBL: None    Procedure:    The pt was identified in the procedure room. After risks benefits and alternatives were discussed informed consent was obtained. A timeout was performed and allergies verified. The pt was placed in the sitting position and the TRAPEZIUS muscle were prepped and draped in the usual sterile fashion.    A 1.5 inch long 22g needle was advanced into the painful area of the muscles on the RIGHT SIDE. After negative aspiration for heme a total of  5ml of 0.5% Lidocaine and 40mg of Kenalog was injected.     The pt tolerated the procedure well and was discharged home in good condition.

## 2024-08-08 ENCOUNTER — APPOINTMENT (OUTPATIENT)
Dept: PAIN MEDICINE | Facility: CLINIC | Age: 69
End: 2024-08-08
Payer: MEDICARE

## 2024-08-08 ENCOUNTER — OFFICE VISIT (OUTPATIENT)
Dept: PAIN MEDICINE | Facility: CLINIC | Age: 69
End: 2024-08-08
Payer: MEDICARE

## 2024-08-08 VITALS
SYSTOLIC BLOOD PRESSURE: 106 MMHG | TEMPERATURE: 97.3 F | OXYGEN SATURATION: 98 % | DIASTOLIC BLOOD PRESSURE: 69 MMHG | HEART RATE: 67 BPM | WEIGHT: 165 LBS | BODY MASS INDEX: 26.63 KG/M2 | RESPIRATION RATE: 15 BRPM

## 2024-08-08 DIAGNOSIS — M54.2 CHRONIC NECK PAIN: ICD-10-CM

## 2024-08-08 DIAGNOSIS — G89.29 CHRONIC NECK PAIN: ICD-10-CM

## 2024-08-08 DIAGNOSIS — M54.12 CERVICAL RADICULITIS: ICD-10-CM

## 2024-08-08 DIAGNOSIS — M54.2 CHRONIC CERVICAL PAIN: ICD-10-CM

## 2024-08-08 DIAGNOSIS — M50.90 CERVICAL DISC DISEASE: ICD-10-CM

## 2024-08-08 DIAGNOSIS — M54.81 BILATERAL OCCIPITAL NEURALGIA: ICD-10-CM

## 2024-08-08 DIAGNOSIS — R51.9 LEFT FACIAL PAIN: Primary | ICD-10-CM

## 2024-08-08 DIAGNOSIS — M48.02 CERVICAL STENOSIS OF SPINE: ICD-10-CM

## 2024-08-08 DIAGNOSIS — M50.20 CERVICAL DISC HERNIATION: ICD-10-CM

## 2024-08-08 DIAGNOSIS — G89.29 CHRONIC CERVICAL PAIN: ICD-10-CM

## 2024-08-08 PROCEDURE — 99214 OFFICE O/P EST MOD 30 MIN: CPT | Performed by: ANESTHESIOLOGY

## 2024-08-08 RX ORDER — OXYCODONE AND ACETAMINOPHEN 5; 325 MG/1; MG/1
1 TABLET ORAL EVERY 8 HOURS PRN
Qty: 90 TABLET | Refills: 0 | Status: SHIPPED | OUTPATIENT
Start: 2024-08-11 | End: 2024-09-10

## 2024-08-08 ASSESSMENT — PAIN SCALES - GENERAL
PAINLEVEL: 7
PAINLEVEL_OUTOF10: 7

## 2024-08-08 ASSESSMENT — ENCOUNTER SYMPTOMS
OCCASIONAL FEELINGS OF UNSTEADINESS: 1
LOSS OF SENSATION IN FEET: 0

## 2024-08-08 ASSESSMENT — PAIN DESCRIPTION - DESCRIPTORS: DESCRIPTORS: SHARP

## 2024-08-08 ASSESSMENT — PAIN - FUNCTIONAL ASSESSMENT: PAIN_FUNCTIONAL_ASSESSMENT: 0-10

## 2024-08-08 NOTE — PROGRESS NOTES
History Of Present Illness  Chuck Tam is a 68 y.o. male presenting with   Chief Complaint   Patient presents with    Follow-up       Patient follos up for posterior head pain and was found to have a mass on exam. He reports he is going [place], [place] to have his mass worked up from JAN to FEB 2024.  He has a long hx of chronic occipital pain on the right side. He also has a history of facial pain, neck pain, headache pain, midback pain.      Recall: He is s/p revision facial surgery on 11-.      Recall:  He is s/p facial and jaw surgery on 1-3-19 for NORAH s/p trach placement and is now healing. He lost 20 lbs during the time his jaw was wired shut. He has a F/U appt on 1-25-19, 2-1-19 with Dr. Talley, [facility]. He was inpatient from Jan 3rd -Jan 14th, 2019. He had a subsequent surgery with Dr. Talley 8-28-19 for additional jaw surgery.      The pain is constant, worse with activity and better with rest. The patient is s/p ACDF in [place] and another ACDF in [place]. He has a hx of Stroke, Craniotomy, and Prostate CA s/p prostatectomy. He did not require any chemo or radiation therapy.      The pain is pressure in his head and sharp, stabbing and shooting to the B/L shoulders. Denies LE paresthesias, weakness, saddle anesthesia, bowel or bladder incontinence. To manage this pain the patient has attempted Percocet 7.5/325mg three times a day he reports 90 pills has lasted him 2 months. He has tried Lyrica, Tramadol, Ibuprofen with no relief. The patient has undergone SNOW 2 years ago with > 50% relief.      The pt reports he quit Tob on Jan 2nd, 2019 since he wasn't able to inhale after his surgery with Dr. Melendez on Jan 3rd, 2019 to remove his oral hardware. However, he has restarted and is currently smoking 2ppd.      SocHx  Denies any drug use  Positive Tobacco 2ppd for many years   Positive EtOH     PAIN SCORE: 7/10 - in his head      Neuro: Juana Page  PCP: Dr. Pearce          Past Medical History  He has a  past medical history of Anxiety disorder, unspecified, Bilateral lumbar radiculopathy (04/24/2023), Cervical disc disorder, unspecified, unspecified cervical region (12/14/2017), Closed fracture of multiple ribs of right side with routine healing, subsequent encounter (02/16/2023), Depression (02/16/2023), Obstructive sleep apnea (adult) (pediatric), Oral yeast infection (02/16/2023), Personal history of (healed) traumatic fracture, Personal history of malignant neoplasm of prostate, Personal history of malignant neoplasm of prostate (10/31/2018), Personal history of malignant neoplasm, unspecified, Personal history of other diseases of the circulatory system, Personal history of other diseases of the digestive system (12/14/2020), Personal history of other endocrine, nutritional and metabolic disease (02/22/2019), Personal history of transient ischemic attack (TIA), and cerebral infarction without residual deficits, and Trigeminal neuralgia (01/15/2021).    Surgical History  He has a past surgical history that includes Carpal tunnel release (02/25/2016); Shoulder surgery (02/25/2016); Other surgical history (09/07/2018); Other surgical history (01/22/2018); Other surgical history (06/04/2020); Elbow surgery (11/08/2017); Neck surgery (11/08/2017); and Cervical fusion (03/01/2021).     Social History  He reports that he has been smoking cigarettes. He has a 100 pack-year smoking history. He has never used smokeless tobacco. He reports that he does not drink alcohol and does not use drugs.    Family History  Family History   Problem Relation Name Age of Onset    COPD Mother      Other (OPCD (olivopontocerebellar degeneration)) Mother      Cancer Father      Lupus Sister          SLE (systemic lupus erthematosus related syndrome)        Allergies  Patient has no known allergies.    Review of Systems    All other systems reviewed and negative for any deficits. Pertinent positives and negatives were considered in the  medical decision making process.        Physical Exam  /69   Pulse 67   Temp 36.3 °C (97.3 °F)   Resp 15   Wt 74.8 kg (165 lb)   SpO2 98%     General: Pt appears stated age      Eyes: Conjunctiva non-icteric and lids without obvious rash or drooping. Pupils are symmetric     ENT: External ears and nose appear to be without deformity or rash. No lesions or masses noted. Hearing is grossly intact     Neck: No JVD noted, tracheal position midline.     Musculoskeletal: Gait is grossly normal     Digits/nails show no clubbing or cyanosis     Exam of muscles/joints/bones shows no gross atrophy and no abnormal/involuntary movements in the head/neckNo asymmetry or masses noted in the head/neck     Stability: no subluxation noted on movement of bilateral upper extremities or head/neck     Strength: 4/5 in RUE and 5/5 in LUE      Range of Motion: WNL      Sensation: dec to sharp touch in RUE     Cranial nerves 2-12 are grossly intact     Psychiatric: Pt is alert and oriented to time, place and person. No signs of opiate intoxication or withdrawal.     Assessment/Plan   1. Bilateral occipital neuralgia  Inject Trigger Point      2. Chronic cervical pain  Inject Trigger Point            Diagnoses/Problems     · Chronic low back pain (724.2,338.29) (M54.50,G89.29)   · Cervical disc herniation (722.0) (M50.20)   · Cervical radiculitis (723.4) (M54.12)   · Chronic neck pain (723.1,338.29) (M54.2,G89.29)   · Lumbar disc herniation (722.10) (M51.26)   · Lumbar foraminal stenosis (724.02) (M48.061)   · Lumbar neuritis (724.4) (M54.16)   · Tobacco abuse counseling (V65.42,305.1) (Z71.6)   · Balance problem (781.99) (R26.89)       Provider Impressions        1. I have previously provided the patient with a list of physical therapy exercises to learn and perform.     2. The patient is a candidate for a SNOW at C7/T1 VERSUS TRIGGER POINT INJECTION vs SNOW C7/T1 to treat back and radicular pain. I spent time with the patient  discussing all of the risks, benefits, and alternatives to this measure. Including but not limited to spinal infection, epidural hematoma/abscess, paralysis, nerve injury, steroid effects, and spinal headache. The patient understands and agrees to proceed as needed.        3. Recall: The pt was previously taking Gabapentin to help with nerve related pain. However, he developed GI distress after taking this medication. I did start him on Topamax instead, however, he was only taking this once a day and did not follow the directions. This was reviewed with him in detail.      He also has a hx of Trigeminal neuralgia as well.     4. I once again advised the patient to quit tobacco as it worsens chronic pain, disc health, and can increase the risk of complications and poor healing with any procedure. Tobacco also causes a whole host of other deadly diseases. I informed the patient that the single most important thing they can do to benefit their health would be to quit tobacco.     He was able to quit for 3 months after his Jaw surgery from Jan - April 2019. HOwever, he restarted. Pt was given information to help him quit.      5. We did discuss the risks, benefits, and alternatives to chronic opioid therapy and that usage can be a danger to life. We also discussed proper and safe storage of medication to prevent unauthorized usage. The patient understands and will use the medicine responsibly.     We did check a urine screen on 2-22-19 and it was appropriately positive for Tramadol and Oxycodone.      Pt signed an opiate contract on 5- and again on 4- and it was reviewed line by line.   Pt provided a UDS on 5- and it was appropriate.      CSA completed 4-, 3-, 3-  UDS on 4-, 3-  OARRS reviewed at his visit.   ORT completed on 1-  Narcan prescribed 8-, 5-      Pt was previously also given a prescription for Narcan. We again discussed how to use  this medication in detail as well as the risks, benefits, and side effects.      6. I extensively reviewed the patients MRI findings in detail, including review of the actual images and provided a detailed explanation of the findings using a spine model.      There is a disc herniation at the L5/S1 with severe foraminal stenosis on the right side at the L5 level.      7. The patient is a candidate for an A RIGHT LTR AT L5 AND S1 and occipital block to treat back and radicular pain. I spent time with the patient discussing all of the risks, benefits, and alternatives to this measure. Including but not limited to spinal infection, epidural hematoma/abscess, paralysis, nerve injury, steroid effects, and spinal headache. The patient understands and agrees to proceed.     His last injection was on RIGHT LTR at L5 and S1 on 6- and he reported 100% relief of his pain that is ONGOING for now.      FUV 3 months. Pt reports he is going to be in [place] from January 2024 to Feb 2024 for his cancer work up care.      I spent time with the patient reviewing their imaging and discussing the risks benefits and alternatives to the above plan. A total of 30 minutes was spent reviewing the data and greater than 50% of that time was with the patient during the face to face encounter discussing treatment options both surgical, non-surgical, and minimally invasive techniques.       Brien Tovar MD    Date of Procedure: 8-8-2024    Preopoerative Dx: RIGHT trapezius muscle spasm  Postoperative Dx: Same as above    Procedure: RIGHT Trapezius muscle steroid injection    Complications: None    EBL: None    Procedure:    The pt was identified in the procedure room. After risks benefits and alternatives were discussed informed consent was obtained. A timeout was performed and allergies verified. The pt was placed in the sitting position and the TRAPEZIUS muscle were prepped and draped in the usual sterile fashion.    A 1.5 inch long 22g  needle was advanced into the painful area of the muscles on the RIGHT SIDE. After negative aspiration for heme a total of 5ml of 0.5% Lidocaine and 40mg of Kenalog was injected.     The pt tolerated the procedure well and was discharged home in good condition.

## 2024-09-04 DIAGNOSIS — M54.2 CHRONIC NECK PAIN: ICD-10-CM

## 2024-09-04 DIAGNOSIS — G89.29 CHRONIC NECK PAIN: ICD-10-CM

## 2024-09-05 RX ORDER — OXYCODONE AND ACETAMINOPHEN 5; 325 MG/1; MG/1
1 TABLET ORAL EVERY 8 HOURS PRN
Qty: 90 TABLET | Refills: 0 | Status: SHIPPED | OUTPATIENT
Start: 2024-09-10 | End: 2024-10-10

## 2024-10-08 ENCOUNTER — APPOINTMENT (OUTPATIENT)
Dept: PRIMARY CARE | Facility: CLINIC | Age: 69
End: 2024-10-08
Payer: MEDICARE

## 2024-10-08 DIAGNOSIS — M54.2 CHRONIC NECK PAIN: ICD-10-CM

## 2024-10-08 DIAGNOSIS — G89.29 CHRONIC NECK PAIN: ICD-10-CM

## 2024-10-08 RX ORDER — OXYCODONE AND ACETAMINOPHEN 5; 325 MG/1; MG/1
1 TABLET ORAL EVERY 8 HOURS PRN
Qty: 90 TABLET | Refills: 0 | Status: SHIPPED | OUTPATIENT
Start: 2024-10-10 | End: 2024-11-09

## 2024-11-05 ENCOUNTER — APPOINTMENT (OUTPATIENT)
Dept: PRIMARY CARE | Facility: CLINIC | Age: 69
End: 2024-11-05
Payer: MEDICARE

## 2024-11-05 VITALS
SYSTOLIC BLOOD PRESSURE: 122 MMHG | BODY MASS INDEX: 25.26 KG/M2 | OXYGEN SATURATION: 97 % | DIASTOLIC BLOOD PRESSURE: 68 MMHG | HEIGHT: 66 IN | TEMPERATURE: 97.7 F | WEIGHT: 157.2 LBS | HEART RATE: 73 BPM

## 2024-11-05 DIAGNOSIS — Z87.891 PERSONAL HISTORY OF NICOTINE DEPENDENCE: ICD-10-CM

## 2024-11-05 DIAGNOSIS — Z12.11 COLON CANCER SCREENING: ICD-10-CM

## 2024-11-05 DIAGNOSIS — G89.29 CHRONIC NECK PAIN: ICD-10-CM

## 2024-11-05 DIAGNOSIS — E03.9 HYPOTHYROIDISM, UNSPECIFIED TYPE: ICD-10-CM

## 2024-11-05 DIAGNOSIS — M54.2 CHRONIC NECK PAIN: ICD-10-CM

## 2024-11-05 DIAGNOSIS — F17.200 CURRENT SMOKER: ICD-10-CM

## 2024-11-05 DIAGNOSIS — Z00.00 ROUTINE GENERAL MEDICAL EXAMINATION AT HEALTH CARE FACILITY: Primary | ICD-10-CM

## 2024-11-05 PROCEDURE — G0439 PPPS, SUBSEQ VISIT: HCPCS | Performed by: STUDENT IN AN ORGANIZED HEALTH CARE EDUCATION/TRAINING PROGRAM

## 2024-11-05 PROCEDURE — 1160F RVW MEDS BY RX/DR IN RCRD: CPT | Performed by: STUDENT IN AN ORGANIZED HEALTH CARE EDUCATION/TRAINING PROGRAM

## 2024-11-05 PROCEDURE — 84443 ASSAY THYROID STIM HORMONE: CPT

## 2024-11-05 PROCEDURE — 99406 BEHAV CHNG SMOKING 3-10 MIN: CPT | Performed by: STUDENT IN AN ORGANIZED HEALTH CARE EDUCATION/TRAINING PROGRAM

## 2024-11-05 PROCEDURE — 3078F DIAST BP <80 MM HG: CPT | Performed by: STUDENT IN AN ORGANIZED HEALTH CARE EDUCATION/TRAINING PROGRAM

## 2024-11-05 PROCEDURE — 3008F BODY MASS INDEX DOCD: CPT | Performed by: STUDENT IN AN ORGANIZED HEALTH CARE EDUCATION/TRAINING PROGRAM

## 2024-11-05 PROCEDURE — 1158F ADVNC CARE PLAN TLK DOCD: CPT | Performed by: STUDENT IN AN ORGANIZED HEALTH CARE EDUCATION/TRAINING PROGRAM

## 2024-11-05 PROCEDURE — 1126F AMNT PAIN NOTED NONE PRSNT: CPT | Performed by: STUDENT IN AN ORGANIZED HEALTH CARE EDUCATION/TRAINING PROGRAM

## 2024-11-05 PROCEDURE — 1123F ACP DISCUSS/DSCN MKR DOCD: CPT | Performed by: STUDENT IN AN ORGANIZED HEALTH CARE EDUCATION/TRAINING PROGRAM

## 2024-11-05 PROCEDURE — 3074F SYST BP LT 130 MM HG: CPT | Performed by: STUDENT IN AN ORGANIZED HEALTH CARE EDUCATION/TRAINING PROGRAM

## 2024-11-05 PROCEDURE — 1170F FXNL STATUS ASSESSED: CPT | Performed by: STUDENT IN AN ORGANIZED HEALTH CARE EDUCATION/TRAINING PROGRAM

## 2024-11-05 PROCEDURE — 1159F MED LIST DOCD IN RCRD: CPT | Performed by: STUDENT IN AN ORGANIZED HEALTH CARE EDUCATION/TRAINING PROGRAM

## 2024-11-05 RX ORDER — OXYCODONE AND ACETAMINOPHEN 5; 325 MG/1; MG/1
1 TABLET ORAL EVERY 8 HOURS PRN
Qty: 90 TABLET | Refills: 0 | Status: SHIPPED | OUTPATIENT
Start: 2024-11-09 | End: 2024-12-09

## 2024-11-05 ASSESSMENT — ACTIVITIES OF DAILY LIVING (ADL)
GROCERY_SHOPPING: INDEPENDENT
DOING_HOUSEWORK: INDEPENDENT
MANAGING_FINANCES: INDEPENDENT
BATHING: INDEPENDENT
TAKING_MEDICATION: INDEPENDENT
DRESSING: INDEPENDENT

## 2024-11-05 ASSESSMENT — PAIN SCALES - GENERAL: PAINLEVEL_OUTOF10: 0-NO PAIN

## 2024-11-05 ASSESSMENT — ENCOUNTER SYMPTOMS
OCCASIONAL FEELINGS OF UNSTEADINESS: 0
LOSS OF SENSATION IN FEET: 0
DEPRESSION: 0

## 2024-11-05 NOTE — PROGRESS NOTES
Subjective   Patient ID: Chuck Tam is a 69 y.o. male who presents for Medicare Annual Wellness Visit Subsequent and Balance Problem.  HPI  Chuck is here to for medicare annual wellness visit.    He has a few acute concerns including excessive fatigue and unexpected weight loss of 11 pounds in the last 45 days. Appetite has been intact and he is eating regularly. He denies any shortness of breath, fevers, chills, night sweats, chest pain, headaches, nausea, vomiting, or diarrhea. He denies any numbness, tingling, or loss of sensation in the lower extremities. He has been taking his levothyroxine as prescribed. No other concerns or complaints.    Continues to smoke 2ppd and has no interest in quitting.    Review of Systems  12-point ROS was reviewed and is negative, unless otherwise noted in HPI    Objective   Vitals:    11/05/24 1410   BP: 122/68   Pulse: 73   Temp: 36.5 °C (97.7 °F)   SpO2: 97%      Physical Exam  GEN: alert, conversant, NAD  HEENT: PERRL, EOMI, MMM, Tms pearly gray bilaterally  NECK: supple, no LAD appreciated  CHEST: CTAB, inspiratory and expiratory wheezing heard bilaterally  CV: S1, S2, RRR, no murmurs appreciated  ABD: soft, NT, ND  EXT: no significant LE edema, radial and posterior tibialis pulses 2+  SKIN: warm, dry    Assessment/Plan   #well adult/medicare wellness visit  - Counseled continued efforts on healthy lifestyle modification including balanced diet, and continued exercise for >5 minutes  - counseled age appropriate vaccines and preventative measures  - ACP reviewed    #weight loss  - reviewed recent labs  - repeat TSH today  - Recommended updating cancer screenings with LDCT and Cologuard testing, patient agreeable    #hx of trigeminal neuralgia  #hx of CVA w/ residual right sided hemiparesis  #hx of craniotomy x2  #cervical stenosis  #chronic head/neck pain  #acute on chronic dysequilibrium/ambulatory dysfunction  - Previously seen by Neurology, Neurosurgery  Following  regularly with Pain Management for trigger point injections and uses Percocet PRN, every 4 months  - offered referral to PT/OT for gait training, strengthening, etc - patient declines  - offered imaging today, patient declines  - offered referral to neurology/neurosurgery, patient declines    #hypothyroidism  - Continue levothyroxine 175 mcg daily  Repeat TSH today    # Dyslipidemia  Stable. Lipid Panel on 7/1/24 wnl  Continue with current management: atorvastatin 10mg daily  Discussed healthy diet and lifestyle.    # HTN  Well controlled.  Continue current medications: Lisinopril 5mg daily  Discussed DASH diet and dietary sodium restrictions.   Increase dietary efforts and physical activity.     #Current smoker  - 2 ppd smoker. >100 pack year hx. Patient has not interest in quitting smoking.   - Advised cessation, counseled cessation tips, offer cessation aids  - Spent >5 minutes counseling smoking cessation  - Strongly advised LDCT, patient agreeable    #NORAH, not on CPAP  Has been followed by Neuro/sleep medicine in the past, has been evaluated for [facility] candidacy in the past as well  Unable to tolerate CPAP    #hx of prostate CA s/p prostatectomy    Health Maintenance:  Vaccines: COVID (x2), Flu (UTD), TDAP (2020), Shingles (advised), Pneumonia (advised), RSV (advised)  Screening: Colonoscopy (~16 years ago, declines continuing), LDCT (declines)  Labs: TSH      RTC in 6 months, or sooner PRN    GERSON DAVIES    Trainee role: Medical Student    I saw and evaluated the patient. I personally obtained the key and critical portions of the history and physical exam or was physically present for key and critical portions performed by the trainee. I reviewed the trainee's documentation and discussed the patient with the trainee. I agree with the trainee's medical decision making as documented on the trainee's notes.    Rodger Borrego,

## 2024-11-06 LAB — TSH SERPL-ACNC: 0.44 MIU/L (ref 0.44–3.98)

## 2024-12-03 ENCOUNTER — HOSPITAL ENCOUNTER (OUTPATIENT)
Dept: RADIOLOGY | Facility: CLINIC | Age: 69
Discharge: HOME | End: 2024-12-03
Payer: MEDICARE

## 2024-12-03 DIAGNOSIS — Z87.891 PERSONAL HISTORY OF NICOTINE DEPENDENCE: ICD-10-CM

## 2024-12-03 DIAGNOSIS — F17.200 CURRENT SMOKER: ICD-10-CM

## 2024-12-03 PROCEDURE — 71271 CT THORAX LUNG CANCER SCR C-: CPT

## 2024-12-09 ENCOUNTER — OFFICE VISIT (OUTPATIENT)
Dept: PAIN MEDICINE | Facility: CLINIC | Age: 69
End: 2024-12-09
Payer: MEDICARE

## 2024-12-09 VITALS
HEART RATE: 70 BPM | OXYGEN SATURATION: 96 % | DIASTOLIC BLOOD PRESSURE: 61 MMHG | RESPIRATION RATE: 16 BRPM | TEMPERATURE: 98.1 F | SYSTOLIC BLOOD PRESSURE: 125 MMHG

## 2024-12-09 DIAGNOSIS — M54.2 CHRONIC CERVICAL PAIN: ICD-10-CM

## 2024-12-09 DIAGNOSIS — M54.81 OCCIPITAL NEURALGIA OF RIGHT SIDE: Primary | ICD-10-CM

## 2024-12-09 DIAGNOSIS — G89.29 CHRONIC NECK PAIN: ICD-10-CM

## 2024-12-09 DIAGNOSIS — Z71.6 TOBACCO ABUSE COUNSELING: ICD-10-CM

## 2024-12-09 DIAGNOSIS — M54.12 CERVICAL RADICULITIS: ICD-10-CM

## 2024-12-09 DIAGNOSIS — M54.2 CHRONIC NECK PAIN: ICD-10-CM

## 2024-12-09 DIAGNOSIS — G89.29 CHRONIC CERVICAL PAIN: ICD-10-CM

## 2024-12-09 PROCEDURE — 99214 OFFICE O/P EST MOD 30 MIN: CPT | Performed by: ANESTHESIOLOGY

## 2024-12-09 ASSESSMENT — PAIN - FUNCTIONAL ASSESSMENT: PAIN_FUNCTIONAL_ASSESSMENT: 0-10

## 2024-12-09 ASSESSMENT — PAIN SCALES - GENERAL
PAINLEVEL_OUTOF10: 10-WORST PAIN EVER
PAINLEVEL_OUTOF10: 10 - WORST POSSIBLE PAIN

## 2024-12-09 ASSESSMENT — ENCOUNTER SYMPTOMS
LOSS OF SENSATION IN FEET: 0
OCCASIONAL FEELINGS OF UNSTEADINESS: 1

## 2024-12-09 ASSESSMENT — PAIN DESCRIPTION - DESCRIPTORS: DESCRIPTORS: SHARP;THROBBING

## 2024-12-09 NOTE — PROGRESS NOTES
History Of Present Illness  Chuck Tam is a 69 y.o. male presenting with   Chief Complaint   Patient presents with    Follow-up       Patient follos up for posterior head pain. He did previously did go to [place], [place] to have his mass worked up from JAN to FEB 2024.  He has a long hx of chronic occipital pain on the right side. He also has a history of facial pain, neck pain, headache pain, midback pain.      Recall: He is s/p revision facial surgery on 11-.      Recall:  He is s/p facial and jaw surgery on 1-3-19 for NORAH s/p trach placement and is now healing. He lost 20 lbs during the time his jaw was wired shut. He has a F/U appt on 1-25-19, 2-1-19 with Dr. Talley [facility]. He was inpatient from Jan 3rd -Jan 14th, 2019. He had a subsequent surgery with Dr. Talley 8-28-19 for additional jaw surgery.      The pain is constant, worse with activity and better with rest. The patient is s/p ACDF in [place] and another ACDF in [place]. He has a hx of Stroke, Craniotomy, and Prostate CA s/p prostatectomy. He did not require any chemo or radiation therapy.      The pain is pressure in his head and sharp, stabbing and shooting to the B/L shoulders. Denies LE paresthesias, weakness, saddle anesthesia, bowel or bladder incontinence. To manage this pain the patient has attempted Percocet 7.5/325mg three times a day he reports 90 pills has lasted him 2 months. He has tried Lyrica, Tramadol, Ibuprofen with no relief. The patient has undergone SNOW 2 years ago with > 50% relief.      The pt reports he quit Tob on Jan 2nd, 2019 since he wasn't able to inhale after his surgery with Dr. Melendez on Jan 3rd, 2019 to remove his oral hardware. However, he has restarted and is currently smoking 2ppd.      SocHx  Denies any drug use  Positive Tobacco 2ppd for many years   Positive EtOH     PAIN SCORE: 7/10 - in his head      Neuro: Juana Page  PCP: Dr. Pearce          Past Medical History  He has a past medical history of Anxiety  disorder, unspecified, Bilateral lumbar radiculopathy (04/24/2023), Cervical disc disorder, unspecified, unspecified cervical region (12/14/2017), Closed fracture of multiple ribs of right side with routine healing, subsequent encounter (02/16/2023), Depression (02/16/2023), Obstructive sleep apnea (adult) (pediatric), Oral yeast infection (02/16/2023), Personal history of (healed) traumatic fracture, Personal history of malignant neoplasm of prostate, Personal history of malignant neoplasm of prostate (10/31/2018), Personal history of malignant neoplasm, unspecified, Personal history of other diseases of the circulatory system, Personal history of other diseases of the digestive system (12/14/2020), Personal history of other endocrine, nutritional and metabolic disease (02/22/2019), Personal history of transient ischemic attack (TIA), and cerebral infarction without residual deficits, and Trigeminal neuralgia (01/15/2021).    Surgical History  He has a past surgical history that includes Carpal tunnel release (02/25/2016); Shoulder surgery (02/25/2016); Other surgical history (09/07/2018); Other surgical history (01/22/2018); Other surgical history (06/04/2020); Elbow surgery (11/08/2017); Neck surgery (11/08/2017); and Cervical fusion (03/01/2021).     Social History  He reports that he has been smoking cigarettes. He has a 100 pack-year smoking history. He has never used smokeless tobacco. He reports that he does not drink alcohol and does not use drugs.    Family History  Family History   Problem Relation Name Age of Onset    COPD Mother      Other (OPCD (olivopontocerebellar degeneration)) Mother      Cancer Father      Lupus Sister          SLE (systemic lupus erthematosus related syndrome)        Allergies  Patient has no known allergies.    Review of Systems    All other systems reviewed and negative for any deficits. Pertinent positives and negatives were considered in the medical decision making process.         Physical Exam  /61   Pulse 70   Temp 36.7 °C (98.1 °F)   Resp 16   SpO2 96%     General: Pt appears stated age      Eyes: Conjunctiva non-icteric and lids without obvious rash or drooping. Pupils are symmetric     ENT: External ears and nose appear to be without deformity or rash. No lesions or masses noted. Hearing is grossly intact     Neck: No JVD noted, tracheal position midline.     Musculoskeletal: Gait is grossly normal     Digits/nails show no clubbing or cyanosis     Exam of muscles/joints/bones shows no gross atrophy and no abnormal/involuntary movements in the head/neckNo asymmetry or masses noted in the head/neck     Stability: no subluxation noted on movement of bilateral upper extremities or head/neck     Strength: 4/5 in RUE and 5/5 in LUE      Range of Motion: WNL      Sensation: dec to sharp touch in RUE     Cranial nerves 2-12 are grossly intact     Psychiatric: Pt is alert and oriented to time, place and person. No signs of opiate intoxication or withdrawal.     Assessment/Plan   1. Occipital neuralgia of right side        2. Chronic cervical pain  Inject Trigger Point      3. Chronic neck pain  Inject Trigger Point      4. Cervical radiculitis              Diagnoses/Problems     · Chronic low back pain (724.2,338.29) (M54.50,G89.29)   · Cervical disc herniation (722.0) (M50.20)   · Cervical radiculitis (723.4) (M54.12)   · Chronic neck pain (723.1,338.29) (M54.2,G89.29)   · Lumbar disc herniation (722.10) (M51.26)   · Lumbar foraminal stenosis (724.02) (M48.061)   · Lumbar neuritis (724.4) (M54.16)   · Tobacco abuse counseling (V65.42,305.1) (Z71.6)   · Balance problem (781.99) (R26.89)       Provider Impressions        1. I have previously provided the patient with a list of physical therapy exercises to learn and perform.     2. The patient is a candidate for a SNOW at C7/T1 VERSUS TRIGGER POINT INJECTION vs SNOW C7/T1 to treat back and radicular pain. I spent time with the  patient discussing all of the risks, benefits, and alternatives to this measure. Including but not limited to spinal infection, epidural hematoma/abscess, paralysis, nerve injury, steroid effects, and spinal headache. The patient understands and agrees to proceed as needed.        3. Recall: The pt was previously taking Gabapentin to help with nerve related pain. However, he developed GI distress after taking this medication. I did start him on Topamax instead, however, he was only taking this once a day and did not follow the directions. This was reviewed with him in detail.      He also has a hx of Trigeminal neuralgia as well.     4. I once again advised the patient to quit tobacco as it worsens chronic pain, disc health, and can increase the risk of complications and poor healing with any procedure. Tobacco also causes a whole host of other deadly diseases. I informed the patient that the single most important thing they can do to benefit their health would be to quit tobacco.     He was able to quit for 3 months after his Jaw surgery from Jan - April 2019. HOwever, he restarted. Pt was given information to help him quit.      5. We did discuss the risks, benefits, and alternatives to chronic opioid therapy and that usage can be a danger to life. We also discussed proper and safe storage of medication to prevent unauthorized usage. The patient understands and will use the medicine responsibly.     We did check a urine screen on 2-22-19 and it was appropriately positive for Tramadol and Oxycodone.      Pt signed an opiate contract on 5- and again on 4- and it was reviewed line by line.   Pt provided a UDS on 5- and it was appropriate.      CSA completed 4-, 3-, 3-  UDS on 4-, 3-  OARRS reviewed at his visit.   ORT completed on 1-  Narcan prescribed 8-, 5-      Pt was previously also given a prescription for Narcan. We again discussed how  to use this medication in detail as well as the risks, benefits, and side effects.      6. I extensively reviewed the patients MRI findings in detail, including review of the actual images and provided a detailed explanation of the findings using a spine model.      There is a disc herniation at the L5/S1 with severe foraminal stenosis on the right side at the L5 level.      7. The patient is a candidate for an A RIGHT LTR AT L5 AND S1 and occipital block to treat back and radicular pain. I spent time with the patient discussing all of the risks, benefits, and alternatives to this measure. Including but not limited to spinal infection, epidural hematoma/abscess, paralysis, nerve injury, steroid effects, and spinal headache. The patient understands and agrees to proceed.     His last injection was on RIGHT LTR at L5 and S1 on 6- and he reported 100% relief of his pain that is ONGOING for now.      FUV 3 months. Pt reports he is going to be in [place] from January 2024 to Feb 2024 for his cancer work up care.      I spent time with the patient reviewing their imaging and discussing the risks benefits and alternatives to the above plan. A total of 30 minutes was spent reviewing the data and greater than 50% of that time was with the patient during the face to face encounter discussing treatment options both surgical, non-surgical, and minimally invasive techniques.       Brien Tovar MD    Date of Procedure: 12-9-2024    Preopoerative Dx: RIGHT trapezius muscle spasm  Postoperative Dx: Same as above    Procedure: RIGHT Trapezius muscle steroid injection    Complications: None    EBL: None    Procedure:    The pt was identified in the procedure room. After risks benefits and alternatives were discussed informed consent was obtained. A timeout was performed and allergies verified. The pt was placed in the sitting position and the TRAPEZIUS muscle were prepped and draped in the usual sterile fashion. His last  injection was on 8-8-2024 and he reported 80% relief of his pain lasting for several months time.     A 1.5 inch long 22g needle was advanced into the painful area of the muscles on the RIGHT SIDE. After negative aspiration for heme a total of 5ml of 0.5% Lidocaine and 40mg of Kenalog was injected.     The pt tolerated the procedure well and was discharged home in good condition.

## 2024-12-12 DIAGNOSIS — G89.29 CHRONIC NECK PAIN: ICD-10-CM

## 2024-12-12 DIAGNOSIS — M54.2 CHRONIC NECK PAIN: ICD-10-CM

## 2024-12-12 RX ORDER — OXYCODONE AND ACETAMINOPHEN 5; 325 MG/1; MG/1
1 TABLET ORAL EVERY 8 HOURS PRN
Qty: 90 TABLET | Refills: 0 | Status: SHIPPED | OUTPATIENT
Start: 2024-12-12 | End: 2025-01-11

## 2025-01-09 ENCOUNTER — TELEPHONE (OUTPATIENT)
Dept: INFUSION THERAPY | Facility: CLINIC | Age: 70
End: 2025-01-09
Payer: MEDICARE

## 2025-01-09 DIAGNOSIS — M54.2 CHRONIC NECK PAIN: ICD-10-CM

## 2025-01-09 DIAGNOSIS — G89.29 CHRONIC NECK PAIN: ICD-10-CM

## 2025-01-09 RX ORDER — OXYCODONE AND ACETAMINOPHEN 5; 325 MG/1; MG/1
1 TABLET ORAL EVERY 8 HOURS PRN
Qty: 90 TABLET | Refills: 0 | Status: SHIPPED | OUTPATIENT
Start: 2025-01-11 | End: 2025-02-10

## 2025-02-05 DIAGNOSIS — G89.29 CHRONIC NECK PAIN: ICD-10-CM

## 2025-02-05 DIAGNOSIS — M54.2 CHRONIC NECK PAIN: ICD-10-CM

## 2025-02-06 RX ORDER — OXYCODONE AND ACETAMINOPHEN 5; 325 MG/1; MG/1
1 TABLET ORAL EVERY 8 HOURS PRN
Qty: 90 TABLET | Refills: 0 | Status: SHIPPED | OUTPATIENT
Start: 2025-02-10 | End: 2025-03-12

## 2025-03-10 ENCOUNTER — APPOINTMENT (OUTPATIENT)
Dept: PAIN MEDICINE | Facility: CLINIC | Age: 70
End: 2025-03-10
Payer: MEDICARE

## 2025-03-10 ENCOUNTER — OFFICE VISIT (OUTPATIENT)
Dept: PAIN MEDICINE | Facility: CLINIC | Age: 70
End: 2025-03-10
Payer: MEDICARE

## 2025-03-10 VITALS
BODY MASS INDEX: 24.64 KG/M2 | HEIGHT: 67 IN | TEMPERATURE: 96.8 F | OXYGEN SATURATION: 99 % | SYSTOLIC BLOOD PRESSURE: 141 MMHG | WEIGHT: 157 LBS | HEART RATE: 50 BPM | DIASTOLIC BLOOD PRESSURE: 76 MMHG | RESPIRATION RATE: 15 BRPM

## 2025-03-10 DIAGNOSIS — G89.29 CHRONIC NECK PAIN: ICD-10-CM

## 2025-03-10 DIAGNOSIS — M54.41 CHRONIC BILATERAL LOW BACK PAIN WITH BILATERAL SCIATICA: ICD-10-CM

## 2025-03-10 DIAGNOSIS — M50.90 CERVICAL DISC DISEASE: ICD-10-CM

## 2025-03-10 DIAGNOSIS — M54.12 CERVICAL RADICULITIS: ICD-10-CM

## 2025-03-10 DIAGNOSIS — M50.20 CERVICAL DISC HERNIATION: ICD-10-CM

## 2025-03-10 DIAGNOSIS — M54.42 CHRONIC BILATERAL LOW BACK PAIN WITH BILATERAL SCIATICA: ICD-10-CM

## 2025-03-10 DIAGNOSIS — G89.29 CHRONIC BILATERAL LOW BACK PAIN WITH BILATERAL SCIATICA: ICD-10-CM

## 2025-03-10 DIAGNOSIS — M54.2 CHRONIC NECK PAIN: ICD-10-CM

## 2025-03-10 DIAGNOSIS — Z79.891 OPIATE ANALGESIC CONTRACT EXISTS: Primary | ICD-10-CM

## 2025-03-10 DIAGNOSIS — M54.81 OCCIPITAL NEURALGIA OF RIGHT SIDE: ICD-10-CM

## 2025-03-10 PROCEDURE — 99214 OFFICE O/P EST MOD 30 MIN: CPT | Performed by: ANESTHESIOLOGY

## 2025-03-10 RX ORDER — OXYCODONE AND ACETAMINOPHEN 5; 325 MG/1; MG/1
1 TABLET ORAL EVERY 8 HOURS PRN
Qty: 90 TABLET | Refills: 0 | Status: SHIPPED | OUTPATIENT
Start: 2025-03-11 | End: 2025-04-10

## 2025-03-10 ASSESSMENT — PAIN SCALES - GENERAL
PAINLEVEL_OUTOF10: 6
PAINLEVEL_OUTOF10: 6

## 2025-03-10 ASSESSMENT — PAIN - FUNCTIONAL ASSESSMENT: PAIN_FUNCTIONAL_ASSESSMENT: 0-10

## 2025-03-10 ASSESSMENT — PATIENT HEALTH QUESTIONNAIRE - PHQ9
1. LITTLE INTEREST OR PLEASURE IN DOING THINGS: NOT AT ALL
2. FEELING DOWN, DEPRESSED OR HOPELESS: NOT AT ALL
SUM OF ALL RESPONSES TO PHQ9 QUESTIONS 1 AND 2: 0

## 2025-03-10 ASSESSMENT — PAIN DESCRIPTION - DESCRIPTORS: DESCRIPTORS: SHARP

## 2025-03-10 ASSESSMENT — ENCOUNTER SYMPTOMS
OCCASIONAL FEELINGS OF UNSTEADINESS: 1
LOSS OF SENSATION IN FEET: 0

## 2025-03-10 NOTE — PROGRESS NOTES
History Of Present Illness  Chuck Tam is a 69 y.o. male presenting with   Chief Complaint   Patient presents with   • Follow-up       Patient follows up for posterior head pain. He did previously did go to [place], [place] to have his mass worked up from JAN to FEB 2024.  He has a long hx of chronic occipital pain on the right side. He also has a history of facial pain, neck pain, headache pain, midback pain.      Recall: He is s/p revision facial surgery on 11-.      Recall:  He is s/p facial and jaw surgery on 1-3-19 for NORAH s/p trach placement and is now healing. He lost 20 lbs during the time his jaw was wired shut. He has a F/U appt on 1-25-19, 2-1-19 with Dr. Talley [facility] [place]. He was inpatient from Jan 3rd -Jan 14th, 2019. He had a subsequent surgery with Dr. Tlaley 8-28-19 for additional jaw surgery.      The pain is constant, worse with activity and better with rest. The patient is s/p ACDF in [place] and another ACDF in [place]. He has a hx of Stroke, Craniotomy, and Prostate CA s/p prostatectomy. He did not require any chemo or radiation therapy.      The pain is pressure in his head and sharp, stabbing and shooting to the B/L shoulders. Denies LE paresthesias, weakness, saddle anesthesia, bowel or bladder incontinence. To manage this pain the patient has attempted Percocet 7.5/325mg three times a day he reports 90 pills has lasted him 2 months. He has tried Lyrica, Tramadol, Ibuprofen with no relief. The patient has undergone SNOW 2 years ago with > 50% relief.      The pt reports he quit Tob on Jan 2nd, 2019 since he wasn't able to inhale after his surgery with Dr. Melendez on Jan 3rd, 2019 to remove his oral hardware. However, he has restarted and is currently smoking 2ppd.      SocHx  Denies any drug use  Positive Tobacco 2ppd for many years   Positive EtOH     PAIN SCORE: 7/10 - in his head      Neuro: Juana Page  PCP: Dr. Pearce          Past Medical History  He has a past medical history of Anxiety  disorder, unspecified, Bilateral lumbar radiculopathy (04/24/2023), Cervical disc disorder, unspecified, unspecified cervical region (12/14/2017), Closed fracture of multiple ribs of right side with routine healing, subsequent encounter (02/16/2023), Depression (02/16/2023), Obstructive sleep apnea (adult) (pediatric), Oral yeast infection (02/16/2023), Personal history of (healed) traumatic fracture, Personal history of malignant neoplasm of prostate, Personal history of malignant neoplasm of prostate (10/31/2018), Personal history of malignant neoplasm, unspecified, Personal history of other diseases of the circulatory system, Personal history of other diseases of the digestive system (12/14/2020), Personal history of other endocrine, nutritional and metabolic disease (02/22/2019), Personal history of transient ischemic attack (TIA), and cerebral infarction without residual deficits, and Trigeminal neuralgia (01/15/2021).    Surgical History  He has a past surgical history that includes Carpal tunnel release (02/25/2016); Shoulder surgery (02/25/2016); Other surgical history (09/07/2018); Other surgical history (01/22/2018); Other surgical history (06/04/2020); Elbow surgery (11/08/2017); Neck surgery (11/08/2017); and Cervical fusion (03/01/2021).     Social History  He reports that he has been smoking cigarettes. He has a 100 pack-year smoking history. He has never used smokeless tobacco. He reports that he does not drink alcohol and does not use drugs.    Family History  Family History   Problem Relation Name Age of Onset   • COPD Mother     • Other (OPCD (olivopontocerebellar degeneration)) Mother     • Cancer Father     • Lupus Sister          SLE (systemic lupus erthematosus related syndrome)        Allergies  Patient has no known allergies.    Review of Systems    All other systems reviewed and negative for any deficits. Pertinent positives and negatives were considered in the medical decision making  process.        Physical Exam  /76   Pulse 50   Temp 36 °C (96.8 °F)   Resp 15   Wt 71.2 kg (157 lb)   SpO2 99%     General: Pt appears stated age      Eyes: Conjunctiva non-icteric and lids without obvious rash or drooping. Pupils are symmetric     ENT: External ears and nose appear to be without deformity or rash. No lesions or masses noted. Hearing is grossly intact     Neck: No JVD noted, tracheal position midline.     Musculoskeletal: Gait is grossly normal     Digits/nails show no clubbing or cyanosis     Exam of muscles/joints/bones shows no gross atrophy and no abnormal/involuntary movements in the head/neckNo asymmetry or masses noted in the head/neck     Stability: no subluxation noted on movement of bilateral upper extremities or head/neck     Strength: 4/5 in RUE and 5/5 in LUE      Range of Motion: WNL      Sensation: dec to sharp touch in RUE     Cranial nerves 2-12 are grossly intact     Psychiatric: Pt is alert and oriented to time, place and person. No signs of opiate intoxication or withdrawal.     Assessment/Plan   1. Opiate analgesic contract exists  Opiate/Opioid/Benzo Prescription Compliance            Diagnoses/Problems     · Chronic low back pain (724.2,338.29) (M54.50,G89.29)   · Cervical disc herniation (722.0) (M50.20)   · Cervical radiculitis (723.4) (M54.12)   · Chronic neck pain (723.1,338.29) (M54.2,G89.29)   · Lumbar disc herniation (722.10) (M51.26)   · Lumbar foraminal stenosis (724.02) (M48.061)   · Lumbar neuritis (724.4) (M54.16)   · Tobacco abuse counseling (V65.42,305.1) (Z71.6)   · Balance problem (781.99) (R26.89)       Provider Impressions        1. I have previously provided the patient with a list of physical therapy exercises to learn and perform.     2. The patient is a candidate for a SNOW at C7/T1 VERSUS TRIGGER POINT INJECTION vs SNOW C7/T1 to treat back and radicular pain. I spent time with the patient discussing all of the risks, benefits, and  alternatives to this measure. Including but not limited to spinal infection, epidural hematoma/abscess, paralysis, nerve injury, steroid effects, and spinal headache. The patient understands and agrees to proceed as needed.        3. Recall: The pt was previously taking Gabapentin to help with nerve related pain. However, he developed GI distress after taking this medication. I did start him on Topamax instead, however, he was only taking this once a day and did not follow the directions. This was reviewed with him in detail.      He also has a hx of Trigeminal neuralgia as well.     4. I once again advised the patient to quit tobacco as it worsens chronic pain, disc health, and can increase the risk of complications and poor healing with any procedure. Tobacco also causes a whole host of other deadly diseases. I informed the patient that the single most important thing they can do to benefit their health would be to quit tobacco.     He was able to quit for 3 months after his Jaw surgery from Jan - April 2019. HOwever, he restarted. Pt was given information to help him quit.      5. We did discuss the risks, benefits, and alternatives to chronic opioid therapy and that usage can be a danger to life. We also discussed proper and safe storage of medication to prevent unauthorized usage. The patient understands and will use the medicine responsibly.     We did check a urine screen on 2-22-19 and it was appropriately positive for Tramadol and Oxycodone.      Pt signed an opiate contract on 5- and again on 4- and it was reviewed line by line.   Pt provided a UDS on 5- and it was appropriate.      CSA completed 4-, 3-, 3-, 3-  UDS on 4-, 3-, 3-  OARRS reviewed at his visit.   ORT completed on 1-  Narcan prescribed 8-, 5-      Pt was previously also given a prescription for Narcan. We again discussed how to use this medication in  detail as well as the risks, benefits, and side effects.      6. I extensively reviewed the patients MRI findings in detail, including review of the actual images and provided a detailed explanation of the findings using a spine model.      There is a disc herniation at the L5/S1 with severe foraminal stenosis on the right side at the L5 level.      7. The patient is a candidate for an A RIGHT LTR AT L5 AND S1 and occipital block to treat back and radicular pain. I spent time with the patient discussing all of the risks, benefits, and alternatives to this measure. Including but not limited to spinal infection, epidural hematoma/abscess, paralysis, nerve injury, steroid effects, and spinal headache. The patient understands and agrees to proceed.     His last injection was on RIGHT LTR at L5 and S1 on 6- and he reported 100% relief of his pain that is ONGOING for now.      FUV 3 months. Pt reports he is going to be in [place] from January 2024 to Feb 2024 for his cancer work up care.      I spent time with the patient reviewing their imaging and discussing the risks benefits and alternatives to the above plan. A total of 30 minutes was spent reviewing the data and greater than 50% of that time was with the patient during the face to face encounter discussing treatment options both surgical, non-surgical, and minimally invasive techniques.       Brien Tovar MD    Date of Procedure: 12-9-2024    Preopoerative Dx: RIGHT trapezius muscle spasm  Postoperative Dx: Same as above    Procedure: RIGHT Trapezius muscle steroid injection    Complications: None    EBL: None    Procedure:    The pt was identified in the procedure room. After risks benefits and alternatives were discussed informed consent was obtained. A timeout was performed and allergies verified. The pt was placed in the sitting position and the TRAPEZIUS muscle were prepped and draped in the usual sterile fashion. His last injection was on 8-8-2024 and he  reported 80% relief of his pain lasting for several months time.     A 1.5 inch long 22g needle was advanced into the painful area of the muscles on the RIGHT SIDE. After negative aspiration for heme a total of 5ml of 0.5% Lidocaine and 40mg of Kenalog was injected.     The pt tolerated the procedure well and was discharged home in good condition.

## 2025-03-11 RX ORDER — OXYCODONE AND ACETAMINOPHEN 5; 325 MG/1; MG/1
1 TABLET ORAL EVERY 8 HOURS PRN
Qty: 90 TABLET | Refills: 0 | OUTPATIENT
Start: 2025-03-11 | End: 2025-04-10

## 2025-03-14 LAB
1OH-MIDAZOLAM UR-MCNC: NEGATIVE NG/ML
7AMINOCLONAZEPAM UR-MCNC: NEGATIVE NG/ML
A-OH ALPRAZ UR-MCNC: NEGATIVE NG/ML
A-OH-TRIAZOLAM UR-MCNC: NEGATIVE NG/ML
AMPHETAMINES UR QL: NEGATIVE NG/ML
BARBITURATES UR QL: NEGATIVE NG/ML
BZE UR QL: NEGATIVE NG/ML
CODEINE UR-MCNC: NEGATIVE NG/ML
CREAT UR-MCNC: 13.4 MG/DL
DRUG SCREEN COMMENT UR-IMP: ABNORMAL
EDDP UR-MCNC: NEGATIVE NG/ML
FENTANYL UR-MCNC: NEGATIVE NG/ML
HYDROCODONE UR-MCNC: NEGATIVE NG/ML
HYDROMORPHONE UR-MCNC: NEGATIVE NG/ML
LORAZEPAM UR-MCNC: NEGATIVE NG/ML
METHADONE UR-MCNC: NEGATIVE NG/ML
MORPHINE UR-MCNC: NEGATIVE NG/ML
NORDIAZEPAM UR-MCNC: NEGATIVE NG/ML
NORFENTANYL UR-MCNC: NEGATIVE NG/ML
NORHYDROCODONE UR CFM-MCNC: NEGATIVE NG/ML
NOROXYCODONE UR CFM-MCNC: 213 NG/ML
NORTRAMADOL UR-MCNC: NEGATIVE NG/ML
OH-ETHYLFLURAZ UR-MCNC: NEGATIVE NG/ML
OXAZEPAM UR-MCNC: NEGATIVE NG/ML
OXIDANTS UR QL: NEGATIVE MCG/ML
OXYCODONE UR CFM-MCNC: 146 NG/ML
OXYMORPHONE UR CFM-MCNC: 107 NG/ML
PCP UR QL: NEGATIVE NG/ML
PH UR: 5.1 [PH] (ref 4.5–9)
QUEST 6 ACETYLMORPHINE: NEGATIVE NG/ML
QUEST NOTES AND COMMENTS: ABNORMAL
QUEST ZOLPIDEM: NEGATIVE NG/ML
SP GR UR: 1
TEMAZEPAM UR-MCNC: NEGATIVE NG/ML
THC UR QL: NEGATIVE NG/ML
TRAMADOL UR-MCNC: NEGATIVE NG/ML
ZOLPIDEM PHENYL-4-CARB UR CFM-MCNC: NEGATIVE NG/ML

## 2025-04-02 ENCOUNTER — APPOINTMENT (OUTPATIENT)
Dept: PAIN MEDICINE | Facility: CLINIC | Age: 70
End: 2025-04-02
Payer: MEDICARE

## 2025-04-07 ENCOUNTER — TELEPHONE (OUTPATIENT)
Dept: PAIN MEDICINE | Facility: CLINIC | Age: 70
End: 2025-04-07
Payer: MEDICARE

## 2025-04-07 DIAGNOSIS — M54.2 CHRONIC NECK PAIN: ICD-10-CM

## 2025-04-07 DIAGNOSIS — G89.29 CHRONIC NECK PAIN: ICD-10-CM

## 2025-04-08 RX ORDER — OXYCODONE AND ACETAMINOPHEN 5; 325 MG/1; MG/1
1 TABLET ORAL EVERY 8 HOURS PRN
Qty: 90 TABLET | Refills: 0 | Status: SHIPPED | OUTPATIENT
Start: 2025-04-10 | End: 2025-05-10

## 2025-05-05 DIAGNOSIS — G89.29 CHRONIC NECK PAIN: ICD-10-CM

## 2025-05-05 DIAGNOSIS — M54.2 CHRONIC NECK PAIN: ICD-10-CM

## 2025-05-05 RX ORDER — OXYCODONE AND ACETAMINOPHEN 5; 325 MG/1; MG/1
1 TABLET ORAL EVERY 8 HOURS PRN
Qty: 90 TABLET | Refills: 0 | Status: SHIPPED | OUTPATIENT
Start: 2025-05-09 | End: 2025-06-08

## 2025-05-06 ENCOUNTER — APPOINTMENT (OUTPATIENT)
Dept: PRIMARY CARE | Facility: CLINIC | Age: 70
End: 2025-05-06
Payer: MEDICARE

## 2025-05-29 DIAGNOSIS — M54.2 CHRONIC NECK PAIN: ICD-10-CM

## 2025-05-29 DIAGNOSIS — G89.29 CHRONIC NECK PAIN: ICD-10-CM

## 2025-05-29 RX ORDER — OXYCODONE AND ACETAMINOPHEN 5; 325 MG/1; MG/1
1 TABLET ORAL EVERY 8 HOURS PRN
Qty: 90 TABLET | Refills: 0 | Status: SHIPPED | OUTPATIENT
Start: 2025-06-08 | End: 2025-07-08